# Patient Record
Sex: FEMALE | Race: BLACK OR AFRICAN AMERICAN | NOT HISPANIC OR LATINO | URBAN - METROPOLITAN AREA
[De-identification: names, ages, dates, MRNs, and addresses within clinical notes are randomized per-mention and may not be internally consistent; named-entity substitution may affect disease eponyms.]

---

## 2019-12-19 ENCOUNTER — EMERGENCY (EMERGENCY)
Facility: HOSPITAL | Age: 24
LOS: 1 days | Discharge: HOME | End: 2019-12-19
Attending: EMERGENCY MEDICINE | Admitting: EMERGENCY MEDICINE
Payer: COMMERCIAL

## 2019-12-19 VITALS
OXYGEN SATURATION: 100 % | TEMPERATURE: 98 F | WEIGHT: 166.89 LBS | DIASTOLIC BLOOD PRESSURE: 68 MMHG | HEIGHT: 67 IN | RESPIRATION RATE: 16 BRPM | SYSTOLIC BLOOD PRESSURE: 114 MMHG | HEART RATE: 79 BPM

## 2019-12-19 LAB
ALBUMIN SERPL ELPH-MCNC: 4.5 G/DL — SIGNIFICANT CHANGE UP (ref 3.3–5)
ALP SERPL-CCNC: 40 U/L — SIGNIFICANT CHANGE UP (ref 40–120)
ALT FLD-CCNC: 7 U/L — LOW (ref 10–45)
ANION GAP SERPL CALC-SCNC: 11 MMOL/L — SIGNIFICANT CHANGE UP (ref 5–17)
AST SERPL-CCNC: 12 U/L — SIGNIFICANT CHANGE UP (ref 10–40)
BASOPHILS # BLD AUTO: 0.08 K/UL — SIGNIFICANT CHANGE UP (ref 0–0.2)
BASOPHILS NFR BLD AUTO: 1.2 % — SIGNIFICANT CHANGE UP (ref 0–2)
BILIRUB SERPL-MCNC: 0.4 MG/DL — SIGNIFICANT CHANGE UP (ref 0.2–1.2)
BUN SERPL-MCNC: 7 MG/DL — SIGNIFICANT CHANGE UP (ref 7–23)
CALCIUM SERPL-MCNC: 9.2 MG/DL — SIGNIFICANT CHANGE UP (ref 8.4–10.5)
CHLORIDE SERPL-SCNC: 105 MMOL/L — SIGNIFICANT CHANGE UP (ref 96–108)
CO2 SERPL-SCNC: 25 MMOL/L — SIGNIFICANT CHANGE UP (ref 22–31)
CREAT SERPL-MCNC: 0.71 MG/DL — SIGNIFICANT CHANGE UP (ref 0.5–1.3)
EOSINOPHIL # BLD AUTO: 0.48 K/UL — SIGNIFICANT CHANGE UP (ref 0–0.5)
EOSINOPHIL NFR BLD AUTO: 7.3 % — HIGH (ref 0–6)
GLUCOSE SERPL-MCNC: 86 MG/DL — SIGNIFICANT CHANGE UP (ref 70–99)
HCG SERPL-ACNC: 817 MIU/ML — HIGH
HCT VFR BLD CALC: 33.9 % — LOW (ref 34.5–45)
HGB BLD-MCNC: 11 G/DL — LOW (ref 11.5–15.5)
IMM GRANULOCYTES NFR BLD AUTO: 0.2 % — SIGNIFICANT CHANGE UP (ref 0–1.5)
LYMPHOCYTES # BLD AUTO: 3.23 K/UL — SIGNIFICANT CHANGE UP (ref 1–3.3)
LYMPHOCYTES # BLD AUTO: 49.4 % — HIGH (ref 13–44)
MCHC RBC-ENTMCNC: 31.3 PG — SIGNIFICANT CHANGE UP (ref 27–34)
MCHC RBC-ENTMCNC: 32.4 GM/DL — SIGNIFICANT CHANGE UP (ref 32–36)
MCV RBC AUTO: 96.6 FL — SIGNIFICANT CHANGE UP (ref 80–100)
MONOCYTES # BLD AUTO: 0.49 K/UL — SIGNIFICANT CHANGE UP (ref 0–0.9)
MONOCYTES NFR BLD AUTO: 7.5 % — SIGNIFICANT CHANGE UP (ref 2–14)
NEUTROPHILS # BLD AUTO: 2.25 K/UL — SIGNIFICANT CHANGE UP (ref 1.8–7.4)
NEUTROPHILS NFR BLD AUTO: 34.4 % — LOW (ref 43–77)
NRBC # BLD: 0 /100 WBCS — SIGNIFICANT CHANGE UP (ref 0–0)
PLATELET # BLD AUTO: 273 K/UL — SIGNIFICANT CHANGE UP (ref 150–400)
POTASSIUM SERPL-MCNC: 4.2 MMOL/L — SIGNIFICANT CHANGE UP (ref 3.5–5.3)
POTASSIUM SERPL-SCNC: 4.2 MMOL/L — SIGNIFICANT CHANGE UP (ref 3.5–5.3)
PROT SERPL-MCNC: 7.6 G/DL — SIGNIFICANT CHANGE UP (ref 6–8.3)
RBC # BLD: 3.51 M/UL — LOW (ref 3.8–5.2)
RBC # FLD: 12.7 % — SIGNIFICANT CHANGE UP (ref 10.3–14.5)
SODIUM SERPL-SCNC: 141 MMOL/L — SIGNIFICANT CHANGE UP (ref 135–145)
WBC # BLD: 6.54 K/UL — SIGNIFICANT CHANGE UP (ref 3.8–10.5)
WBC # FLD AUTO: 6.54 K/UL — SIGNIFICANT CHANGE UP (ref 3.8–10.5)

## 2019-12-19 PROCEDURE — 99284 EMERGENCY DEPT VISIT MOD MDM: CPT

## 2019-12-19 PROCEDURE — 76801 OB US < 14 WKS SINGLE FETUS: CPT | Mod: 26

## 2019-12-19 NOTE — ED PROVIDER NOTE - CARE PLAN
Principal Discharge DX:	Vaginal bleeding  Secondary Diagnosis:	DUB (dysfunctional uterine bleeding) Principal Discharge DX:	Pregnancy  Secondary Diagnosis:	Vaginal bleeding Principal Discharge DX:	Miscarriage  Secondary Diagnosis:	Vaginal bleeding

## 2019-12-19 NOTE — ED PROVIDER NOTE - CLINICAL SUMMARY MEDICAL DECISION MAKING FREE TEXT BOX
Patient with vaginal bleeding post IUD falling out. Will check labs and pregnancy. If all neg clear for discharge. F/u with gyn. Patient with vaginal bleeding post IUD falling out. Will check labs and pregnancy. If all neg clear for discharge. F/u with gyn.  +  HCG  847  will proceed with U/S  T/S sent  GYN  aware - no active bl;elida Patient with vaginal bleeding post IUD falling out. Will check labs and pregnancy. If all neg clear for discharge. F/u with gyn.  +  HCG  847  will proceed with U/S  T/S sent  GYN  aware - no active bleeding-   U/S likely SAB  rx motrin and cytec  reterun to Ed in 2 days for beta re-check or sooner if increased bleeding pain fever

## 2019-12-19 NOTE — ED PROVIDER NOTE - PROGRESS NOTE DETAILS
seen by GYN - recommend cytotec buccally. pt to try expectant mgmt.  recommended to return to ED in 2 days.  I have discussed the discharge plan with the patient. The patient agrees with the plan, as discussed.  The patient understands Emergency Department diagnosis is a preliminary diagnosis often based on limited information and that the patient must adhere to the follow-up plan as discussed.  The patient understands that if the symptoms worsen  the patient may return to the Emergency Department at any time for further evaluation and treatment.

## 2019-12-19 NOTE — ED PROVIDER NOTE - PATIENT PORTAL LINK FT
You can access the FollowMyHealth Patient Portal offered by [facility] by registering at the following website: [email or web address]. By joining [employer]’s FollowMyHealth portal, you will also be able to view your health information using other applications (apps) compatible with our system.

## 2019-12-19 NOTE — ED ADULT TRIAGE NOTE - CHIEF COMPLAINT QUOTE
"I had an IUD but it came out yesterday, now I'm having bleeding and cramping and I want to know if I'm pregnant"

## 2019-12-19 NOTE — ED PROVIDER NOTE - NSFOLLOWUPINSTRUCTIONS_ED_ALL_ED_FT
Return to Emergency Department in 2 days, , for repeat labs.      Miscarriage    WHAT YOU NEED TO KNOW:    A miscarriage is the loss of a fetus within the first 20 weeks of pregnancy. A miscarriage may also be called a spontaneous  or an early pregnancy loss.     DISCHARGE INSTRUCTIONS:    Return to the emergency department if:     You have foul-smelling drainage or pus coming from your vagina.      You have heavy vaginal bleeding and soak 1 pad or more in an hour.       You have severe abdominal pain.      You feel like your heart is beating faster than normal.       You feel extremely weak or dizzy.     Contact your healthcare provider if:     You have a fever greater than 100.4°F or chills.       You have extreme sadness, grief, or feel unable to cope with what has happened.       You have questions or concerns about your condition or care.    Self-care:     Do not put anything in your vagina for 2 weeks or as directed. Do not use tampons, douche, or have sex. These actions can cause infection and pain.       Use sanitary pads as needed. You may have light bleeding or spotting for 2 weeks.       Do not take a bath or go swimming for 2 weeks or as directed. These actions may increase your risk for an infection. Take showers only.       Rest as needed. Slowly start to do more each day. Return to your daily activities as directed.       Talk to your healthcare provider about birth control. If you would like to prevent another pregnancy, ask your healthcare provider which type of birth control is best for you.       Join a support group or therapy to help you cope. A miscarriage may be very difficult for you, your partner, and other members of your family. There is no right way to feel after a miscarriage. You may feel overwhelming grief or other emotions. It may be helpful to talk to a friend, family member, or counselor about your feelings. You may worry that you could have another miscarriage. Talk to your healthcare provider about your concerns. He may be able to help you reduce the risk for another miscarriage. He may also help you find ways to cope with grief.     For more information:     The [employer]  [address] [address]  [place] [address]  Phone: [phone]  Phone: 1-760.330.5012  Web Address: http://www.ac.org      [employer]  [address] [place], [place] [address]  Web Address: http://www.[employer]      Follow up with your healthcare provider as directed: You may need to see your healthcare provider for blood tests or an ultrasound. Write down your questions so you remember to ask them during your visits.

## 2019-12-19 NOTE — ED PROVIDER NOTE - OBJECTIVE STATEMENT
23 y/o f with no pmh present to ED c/o IUD fell out yesterday arounf 5pm and began to have vaginal bleeding with cramping.  She report of IUD was in place for 2-3 months. She report of vaginal bleeding being heavier than a menstrual cycle. LMP 11/22/19. Denies sob, chest pain, n, v,. Last sexual intercourse was a week ago.

## 2019-12-20 VITALS
SYSTOLIC BLOOD PRESSURE: 122 MMHG | OXYGEN SATURATION: 98 % | HEART RATE: 78 BPM | RESPIRATION RATE: 18 BRPM | DIASTOLIC BLOOD PRESSURE: 68 MMHG

## 2019-12-20 LAB
BLD GP AB SCN SERPL QL: NEGATIVE — SIGNIFICANT CHANGE UP
RH IG SCN BLD-IMP: POSITIVE — SIGNIFICANT CHANGE UP

## 2019-12-20 PROCEDURE — 36415 COLL VENOUS BLD VENIPUNCTURE: CPT

## 2019-12-20 PROCEDURE — 85025 COMPLETE CBC W/AUTO DIFF WBC: CPT

## 2019-12-20 PROCEDURE — 86850 RBC ANTIBODY SCREEN: CPT

## 2019-12-20 PROCEDURE — 99284 EMERGENCY DEPT VISIT MOD MDM: CPT

## 2019-12-20 PROCEDURE — 86900 BLOOD TYPING SEROLOGIC ABO: CPT

## 2019-12-20 PROCEDURE — 84702 CHORIONIC GONADOTROPIN TEST: CPT

## 2019-12-20 PROCEDURE — 76801 OB US < 14 WKS SINGLE FETUS: CPT

## 2019-12-20 PROCEDURE — 80053 COMPREHEN METABOLIC PANEL: CPT

## 2019-12-20 PROCEDURE — 86901 BLOOD TYPING SEROLOGIC RH(D): CPT

## 2019-12-20 RX ORDER — IBUPROFEN 200 MG
600 TABLET ORAL ONCE
Refills: 0 | Status: COMPLETED | OUTPATIENT
Start: 2019-12-20 | End: 2019-12-20

## 2019-12-20 RX ADMIN — Medication 600 MILLIGRAM(S): at 00:59

## 2019-12-20 NOTE — CONSULT NOTE ADULT - SUBJECTIVE AND OBJECTIVE BOX
24y  with Last Menstrual Period 11/10 presenting with vaginal bleeding that started after a spontaneous IUD explosion yesterday.  2019 Placed a copper IUD, yesterday in the shower reports abdominal cramps after which vaginal bleeding with clots appeared requiring pad chage every 2 hours. This evening the bleeding subsided and still endorses cramps (tolerable).  Denies fever, chills, chest pain, palpitations, SOB, n/v.  +flatus, +BM    OB H/x:  G1 -    G2 - 2018 C/S  G3 - current    GYN H/x:  None  Copper IUD placed 2009    MED H/x:  None    SURG H/x:  2018 C/S    Medications:  None    Allergies:   NKDA     Vital Signs Last 24 Hrs  T(C): 36.4 (19 Dec 2019 19:44), Max: 36.4 (19 Dec 2019 19:44)  T(F): 97.6 (19 Dec 2019 19:44), Max: 97.6 (19 Dec 2019 19:44)  HR: 79 (19 Dec 2019 19:44) (79 - 79)  BP: 114/68 (19 Dec 2019 19:44) (114/68 - 114/68)  RR: 16 (19 Dec 2019 19:44) (16 - 16)  SpO2: 100% (19 Dec 2019 19:44) (100% - 100%)    Physical Exam:  Gen: NAD, comfortable  GI: soft, mild tenderness over the uterus, nondistended + BS, no rebound no guarding  BME: normal anteverted uterus, no adnexal masses appreciated ,mild cervical motion tenderness   Spec: Cervix appears open 1cm with mild bleeding (menses-like).  Ext: no edema, erythema, tenderness     LABS:                        11.0   6.54  )-----------( 273      ( 19 Dec 2019 20:45 )             33.9         141  |  105  |  7   ----------------------------<  86  4.2   |  25  |  0.71    Ca    9.2      19 Dec 2019 22:30    TPro  7.6  /  Alb  4.5  /  TBili  0.4  /  DBili  x   /  AST  12  /  ALT  7<L>  /  AlkPhos  40      HCG - 817      RADIOLOGY & ADDITIONAL STUDIES:  < from: US OB <=14 Weeks, First Gestation (19 @ 23:15) >    EXAM:  US OB LES THAN 14 WKS 1ST GEST                          PROCEDURE DATE:  2019          INTERPRETATION:  PELVIC ULTRASOUND dated 2019 11:15 PM    INDICATION: Pregnancy. Vaginal bleeding.   AGE: 24 Beta-hC LMP: 11/10/2019    TECHNIQUE: Transabdominal views of the pelvis were obtained. Transvaginal imaging was deferred due to the presence of a structure in the cervix, as per department protocol.    PRIOR STUDIES: None    FINDINGS:     There is a 3.1 x 3.2 x 3.3 cm thick-walled cystic structure in the lower uterine segment/endocervical canal likely representing an  in progress. No fetal pole or yolk sac is seen. No other evidence of intrauterine or extra uterine gestation is seen.    These images demonstrate theuterus to be anteverted. The uterus is normal in size and shape, measuring 11.2 x 2.8 x 6.0 cm. No myometrial abnormalities are seen. The endometrium is 0.7 cm in thickness, which is normal.    A 4.8 x 3.4 x 4.1 cm dermoid cyst is seen in the right adnexa. The contiguous native right ovarian parenchyma is normal in appearance with arterial and venous flow. The left ovary is normal in size, measuring 3.0 x 1.5 x 2.2 cm with a calculated volume of 5.3 mL. No left ovarian masses are seen. Arterial and venous flow is demonstrated to the left ovary without evidence of torsion.    No free fluid is seen in the cul-de-sac.      IMPRESSION:   1.  3.3 cm thick-walled cystic structure in the lower uterine segment/endocervical canal suggestive of spontaneous  in progress. Continued surveillance and serial beta-hCG measurement is recommended.  2.  4.8 cm right ovarian dermoid cyst.            Thank you for the opportunity to participate in the care of this patient.    DEION LANDA M.D., RADIOLOGY RESIDENT  This document has been electronically signed.  IMAN ANDINO M.D., ATTENDING RADIOLOGIST  This document has been electronically signed. Dec 19 2019 11:36PM                  < end of copied text >

## 2019-12-20 NOTE — CONSULT NOTE ADULT - ASSESSMENT
24y  with Last Menstrual Period 11/10 presenting with vaginal bleeding that started after a spontaneous IUD expulsion yesterday.  The patient was unaware of this pregnancy, most likely in the process of spontaneous .   On US:   1.  3.3 cm thick-walled cystic structure in the lower uterine segment/endocervical canal suggestive of spontaneous  in progress. Continued surveillance and serial beta-hCG measurement is recommended.  2.  4.8 cm right ovarian dermoid cyst.    Labs WNL (HB 11), Blood type RH positive.  The situation was discussed with the patient and the option of giving Cytotec to facilitate the miscarriage vs. observation (risk and benefits were discussed) - the patient opted for Cytotec.    800 mcg buccal Cytotec was given in the ED with 600mg of Motrin.  The Patient was consulted to return to the ED in two days for a f/u check.    - No acute GYN intervention indicated at this time in regards to Rt ovarian cyst - to be f/u with primary GYN.  - Patient is hemodynamically stable, feeling overall well and may be discharged from a GYN perspective.   - Strict return precautions given: severe pain, heavy bleeding; strict pelvic rest.     Patient evaluated at bedside with OB-GYN attending Dr. Russell.

## 2019-12-21 ENCOUNTER — EMERGENCY (EMERGENCY)
Facility: HOSPITAL | Age: 24
LOS: 1 days | Discharge: HOME | End: 2019-12-21
Attending: EMERGENCY MEDICINE | Admitting: EMERGENCY MEDICINE
Payer: COMMERCIAL

## 2019-12-21 VITALS
HEART RATE: 72 BPM | HEIGHT: 67 IN | RESPIRATION RATE: 18 BRPM | TEMPERATURE: 98 F | SYSTOLIC BLOOD PRESSURE: 112 MMHG | OXYGEN SATURATION: 100 % | WEIGHT: 164.91 LBS | DIASTOLIC BLOOD PRESSURE: 57 MMHG

## 2019-12-21 LAB — HCG SERPL-ACNC: 247 MIU/ML — HIGH

## 2019-12-21 PROCEDURE — 84702 CHORIONIC GONADOTROPIN TEST: CPT

## 2019-12-21 PROCEDURE — 36415 COLL VENOUS BLD VENIPUNCTURE: CPT

## 2019-12-21 PROCEDURE — 99284 EMERGENCY DEPT VISIT MOD MDM: CPT | Mod: 25

## 2019-12-21 PROCEDURE — 99284 EMERGENCY DEPT VISIT MOD MDM: CPT

## 2019-12-21 NOTE — ED PROVIDER NOTE - PATIENT PORTAL LINK FT
You can access the FollowMyHealth Patient Portal offered by [facility] by registering at the following website: [email or web address]. By joining [employer]’s FollowMyHealth portal, you will also be able to view your health information using other applications (apps) compatible with our system.

## 2019-12-21 NOTE — CONSULT NOTE ADULT - SUBJECTIVE AND OBJECTIVE BOX
24y  with Last Menstrual Period 11/10 presenting for repeat b-hcg after episode of vaginal bleeding and IUD expulsion. Patient reports mild to moderate amount of bleeding for the past two days with cramping but pain has been tolerable.     Denies fever, chills, chest pain, palpitations, SOB, n/v.  +flatus, +BM    OB H/x:  G1 -    G2 - 2018 C/S  G3 - current    GYN H/x:  None  Copper IUD placed 2009- expelled     MED H/x:  None    SURG H/x:  2018 C/S    Medications:  None    Allergies:   NKDA     Vital Signs Last 24 Hrs  T(C): 36.4 (19 Dec 2019 19:44), Max: 36.4 (19 Dec 2019 19:44)  T(F): 97.6 (19 Dec 2019 19:44), Max: 97.6 (19 Dec 2019 19:44)  HR: 79 (19 Dec 2019 19:44) (79 - 79)  BP: 114/68 (19 Dec 2019 19:44) (114/68 - 114/68)  RR: 16 (19 Dec 2019 19:44) (16 - 16)  SpO2: 100% (19 Dec 2019 19:44) (100% - 100%)    Physical Exam:  Gen: NAD, comfortable  GI: soft, mild tenderness over the uterus, nondistended + BS, no rebound no guarding  BME: normal anteverted uterus, no adnexal masses appreciated ,mild cervical motion tenderness   Spec: Cervix appears open 1cm with mild bleeding (menses-like).  Ext: no edema, erythema, tenderness     LABS:                        11.0   6.54  )-----------( 273      ( 19 Dec 2019 20:45 )             33.9         141  |  105  |  7   ----------------------------<  86  4.2   |  25  |  0.71    Ca    9.2      19 Dec 2019 22:30    TPro  7.6  /  Alb  4.5  /  TBili  0.4  /  DBili  x   /  AST  12  /  ALT  7<L>  /  AlkPhos  40      HCG - 817      RADIOLOGY & ADDITIONAL STUDIES:  < from: US OB <=14 Weeks, First Gestation (19 @ 23:15) >    EXAM:  US OB LES THAN 14 WKS 1ST GEST                          PROCEDURE DATE:  2019          INTERPRETATION:  PELVIC ULTRASOUND dated 2019 11:15 PM    INDICATION: Pregnancy. Vaginal bleeding.   AGE: 24 Beta-hC LMP: 11/10/2019    TECHNIQUE: Transabdominal views of the pelvis were obtained. Transvaginal imaging was deferred due to the presence of a structure in the cervix, as per department protocol.    PRIOR STUDIES: None    FINDINGS:     There is a 3.1 x 3.2 x 3.3 cm thick-walled cystic structure in the lower uterine segment/endocervical canal likely representing an  in progress. No fetal pole or yolk sac is seen. No other evidence of intrauterine or extra uterine gestation is seen.    These images demonstrate theuterus to be anteverted. The uterus is normal in size and shape, measuring 11.2 x 2.8 x 6.0 cm. No myometrial abnormalities are seen. The endometrium is 0.7 cm in thickness, which is normal.    A 4.8 x 3.4 x 4.1 cm dermoid cyst is seen in the right adnexa. The contiguous native right ovarian parenchyma is normal in appearance with arterial and venous flow. The left ovary is normal in size, measuring 3.0 x 1.5 x 2.2 cm with a calculated volume of 5.3 mL. No left ovarian masses are seen. Arterial and venous flow is demonstrated to the left ovary without evidence of torsion.    No free fluid is seen in the cul-de-sac.      IMPRESSION:   1.  3.3 cm thick-walled cystic structure in the lower uterine segment/endocervical canal suggestive of spontaneous  in progress. Continued surveillance and serial beta-hCG measurement is recommended.  2.  4.8 cm right ovarian dermoid cyst.            Thank you for the opportunity to participate in the care of this patient.    DEION LANDA M.D., RADIOLOGY RESIDENT  This document has been electronically signed.  IMAN ANDINO M.D., ATTENDING RADIOLOGIST  This document has been electronically signed. Dec 19 2019 11:36PM                  < end of copied text > 24y  with Last Menstrual Period 11/10 presenting for repeat b-hcg after episode of vaginal bleeding and IUD expulsion. Patient reports mild to moderate amount of bleeding for the past two days with cramping but pain has been tolerable. Patient received cytotec on     Denies fever, chills, chest pain, palpitations, SOB, n/v.  +flatus, +BM    OB H/x:  G1 -    G2 - 2018 C/S  G3 - current    GYN H/x:  None  Copper IUD placed 2009- expelled     MED H/x:  None    SURG H/x:  2018 C/S    Medications:  None    Allergies:   NKDA     Vital Signs Last 24 Hrs  T(C): 36.8 (21 Dec 2019 19:20), Max: 36.8 (21 Dec 2019 19:20)  T(F): 98.2 (21 Dec 2019 19:20), Max: 98.2 (21 Dec 2019 19:20)  HR: 72 (21 Dec 2019 19:20) (72 - 72)  BP: 112/57 (21 Dec 2019 19:20) (112/57 - 112/57)  BP(mean): --  RR: 18 (21 Dec 2019 19:20) (18 - 18)  SpO2: 100% (21 Dec 2019 19:20) (100% - 100%)    Physical Exam:  Gen: NAD, comfortable  GI: soft, mild tenderness over the uterus, nondistended + BS, no rebound no guarding  BME: normal anteverted uterus, no adnexal masses appreciated ,mild cervical motion tenderness   Spec: Cervix appears open 1cm with clot at cervical os  Ext: no edema, erythema, tenderness           HCG Quantitative, Serum (19 @ 19:58)    HCG Quantitative, Serum: 247: HCG-Quantitative test interpretations: This test is intended only for the  detection & monitoring of pregnancy. For oncology studies order the tumor  marker test “HCG-TM” (hCG-Tumor Marker).  For pregnancy evaluation the reference values are as follows:  Negative:  <=4 mIU/mL  Indeterminate:  5 - 25 mIU/mL (suggest repeat testing in 72 hours)  Positive:  > 25 mIU/mL  Reference Values during Pregnancy:  Weeks after LMP* REFERENCE INTERVAL mIU/mL     3      6 - 71     4      10 - 750     5      220 - 7,100     6      160 - 32,000     7      3,700 - 164,000     8      32,000 - 150,000     9      64,000 - 151,000     10      47,000 - 187,000     12      28,000 - 211,000     14      14,000 - 63,000     15      12,000 - 71,000     16 - 18  8,000 - 58,000  * LMP:  Last Menstrual Period  HCG results of false positive and false negative for pregnancy are rare,  but can occur with this, and other, hCG tests. Rody- and post-menopausal  females may have mildly elevated hCG concentrations usually less than 14  mIU/mL that are constant over time. mIU/mL

## 2019-12-21 NOTE — ED PROVIDER NOTE - NS ED ROS FT
Constitutional: No fever or chills.   Eyes: No pain, blurry vision, or discharge.  ENMT: No hearing changes, pain, discharge or infections. No neck pain or stiffness.  Cardiac: No chest pain, SOB or edema. No chest pain with exertion.  Respiratory: No cough or respiratory distress. No hemoptysis. No history of asthma or RAD.  GI: No nausea, vomiting, diarrhea   : See HPI  MS: No myalgia, muscle weakness, joint pain or back pain.  Neuro: No headache or weakness. No LOC.  Skin: No skin rash.   Endocrine: No history of thyroid disease or diabetes.  Except as documented in the HPI, all other systems are negative.

## 2019-12-21 NOTE — ED PROVIDER NOTE - OBJECTIVE STATEMENT
Pt w/ no sig PMx, , LMP 11/10 presenting for repeat HCG after episode of vaginal bleeding and IUD expulsion on , found to be pregnant. HCG at that time 817, Rh+, US showing "3.3 cm thick-walled cystic structure in the lower uterine segment/endocervical canal suggestive of spontaneous  in progress." Pt seen by gyn and pt received Cytotec on . Patient reports mild to moderate amount of bleeding for the past two days, report decreased amount of bleeding since original presentation,  with mild cramping, that has been tolerable.

## 2019-12-21 NOTE — ED ADULT NURSE NOTE - CHPI ED NUR SYMPTOMS NEG
no blood in stool/no chills/no diarrhea/no dysuria/no hematuria/no nausea/no fever/no abdominal distension

## 2019-12-21 NOTE — ED PROVIDER NOTE - CLINICAL SUMMARY MEDICAL DECISION MAKING FREE TEXT BOX
Pt here for repeat HCG s/p recent Cytotec for likely missed AB w/ IUD expulsion, less likely ectopic. Benign, non tender abd. HCG and gyn consult. Dispo pending gyn recommendations.

## 2019-12-21 NOTE — CONSULT NOTE ADULT - ASSESSMENT
24y  with Last Menstrual Period 11/10 presenting with vaginal bleeding that started after a spontaneous IUD expulsion yesterday.  The patient was unaware of this pregnancy, most likely in the process of spontaneous .   On US:   1.  3.3 cm thick-walled cystic structure in the lower uterine segment/endocervical canal suggestive of spontaneous  in progress. Continued surveillance and serial beta-hCG measurement is recommended.  2.  4.8 cm right ovarian dermoid cyst.    Labs WNL (HB 11), Blood type RH positive.  The situation was discussed with the patient and the option of giving Cytotec to facilitate the miscarriage vs. observation (risk and benefits were discussed) - the patient opted for Cytotec.    800 mcg buccal Cytotec was given in the ED with 600mg of Motrin.  The Patient was consulted to return to the ED in two days for a f/u check.    - No acute GYN intervention indicated at this time in regards to Rt ovarian cyst - to be f/u with primary GYN.  - Patient is hemodynamically stable, feeling overall well and may be discharged from a GYN perspective.   - Strict return precautions given: severe pain, heavy bleeding; strict pelvic rest.     Patient evaluated at bedside with OB-GYN attending Dr. Russell. 24y  with Last Menstrual Period 11/10 presenting with vaginal bleeding that started after a spontaneous IUD expulsion yesterday.  The patient was unaware of this pregnancy, most likely in the process of spontaneous . B-hcg 817>247 today with continued bleeding s/p cytotec 800mcg buccally     On bedside US: Cervix dilated, with clot in cervix, nothing visualized in the  uterus.     - No acute GYN intervention indicated at this time i  - Patient is hemodynamically stable, feeling overall well and may be discharged from a GYN perspective.   - Strict return precautions given: severe pain, heavy bleeding; strict pelvic rest.   -Patient to return in 1 week  for repeat b-hcg     Patient discussed with OB-GYN attending Dr. He

## 2019-12-21 NOTE — ED PROVIDER NOTE - PHYSICAL EXAMINATION
Constitutional: Well appearing, well nourished, awake, alert, oriented to person, place, time/situation and in no apparent distress.  ENMT: Airway patent. Normal MM  Eyes: Clear bilaterally  Cardiac: Normal rate, regular rhythm.  Heart sounds S1, S2.  No murmurs, rubs or gallops.  Respiratory: Breaths sounds equal and clear b/l. No increased WOB, tachypnea, hypoxia, or accessory mm use. Pt speaks in full sentences.   Gastrointestinal: Abd soft, NT, ND, NABS. No guarding, rebound, or rigidity. No pulsatile abdominal masses. No organomegaly appreciated. No CVAT    deferred to gyn  Musculoskeletal: Range of motion is not limited  Neuro: Alert and oriented x 3, face symmetric and speech fluent. Strength 5/5 x 4 ext and symmetric, nml gross motor movement, nml gait. No focal deficits noted.  Skin: Skin normal color for race, warm, dry and intact. No evidence of rash.  Psych: Alert and oriented to person, place, time/situation. normal mood and affect. no apparent risk to self or others.

## 2019-12-21 NOTE — ED PROVIDER NOTE - NSFOLLOWUPINSTRUCTIONS_ED_ALL_ED_FT
Your pregnancy hormone level (HCG) decreased from 817 to 247. PLEASE RETURN IN 1 WEEK FOR REPEAT HCG TESTING. Return to the ED sooner if you experience heavy bleeding (more than 1 pad per hour), severe abdominal pain, feel faint, or other concerning symptoms.    Miscarriage    WHAT YOU NEED TO KNOW:    A miscarriage is the loss of a fetus within the first 20 weeks of pregnancy. A miscarriage may also be called a spontaneous  or an early pregnancy loss.     DISCHARGE INSTRUCTIONS:    Return to the emergency department if:     You have foul-smelling drainage or pus coming from your vagina.      You have heavy vaginal bleeding and soak 1 pad or more in an hour.       You have severe abdominal pain.      You feel like your heart is beating faster than normal.       You feel extremely weak or dizzy.     Contact your healthcare provider if:     You have a fever greater than 100.4°F or chills.       You have extreme sadness, grief, or feel unable to cope with what has happened.       You have questions or concerns about your condition or care.    Self-care:     Do not put anything in your vagina for 2 weeks or as directed. Do not use tampons, douche, or have sex. These actions can cause infection and pain.       Use sanitary pads as needed. You may have light bleeding or spotting for 2 weeks.       Do not take a bath or go swimming for 2 weeks or as directed. These actions may increase your risk for an infection. Take showers only.       Rest as needed. Slowly start to do more each day. Return to your daily activities as directed.       Talk to your healthcare provider about birth control. If you would like to prevent another pregnancy, ask your healthcare provider which type of birth control is best for you.       Join a support group or therapy to help you cope. A miscarriage may be very difficult for you, your partner, and other members of your family. There is no right way to feel after a miscarriage. You may feel overwhelming grief or other emotions. It may be helpful to talk to a friend, family member, or counselor about your feelings. You may worry that you could have another miscarriage. Talk to your healthcare provider about your concerns. He may be able to help you reduce the risk for another miscarriage. He may also help you find ways to cope with grief.     For more information:     The [employer]  [address] [address]  [place] [address]  Phone: [phone]  Phone: [phone]  Web Address: http://www.acog.org      [facility]  [address] [place] [address]  Web Address: [email or web address]      Follow up with your healthcare provider as directed: You may need to see your healthcare provider for blood tests or an ultrasound. Write down your questions so you remember to ask them during your visits.

## 2019-12-22 PROBLEM — Z78.9 OTHER SPECIFIED HEALTH STATUS: Chronic | Status: ACTIVE | Noted: 2019-12-19

## 2019-12-24 DIAGNOSIS — O03.9 COMPLETE OR UNSPECIFIED SPONTANEOUS ABORTION WITHOUT COMPLICATION: ICD-10-CM

## 2019-12-24 DIAGNOSIS — N93.9 ABNORMAL UTERINE AND VAGINAL BLEEDING, UNSPECIFIED: ICD-10-CM

## 2019-12-24 DIAGNOSIS — Z3A.00 WEEKS OF GESTATION OF PREGNANCY NOT SPECIFIED: ICD-10-CM

## 2019-12-24 DIAGNOSIS — R10.2 PELVIC AND PERINEAL PAIN: ICD-10-CM

## 2019-12-26 ENCOUNTER — APPOINTMENT (OUTPATIENT)
Dept: FAMILY MEDICINE | Facility: CLINIC | Age: 24
End: 2019-12-26

## 2019-12-26 DIAGNOSIS — O03.9 COMPLETE OR UNSPECIFIED SPONTANEOUS ABORTION WITHOUT COMPLICATION: ICD-10-CM

## 2019-12-26 DIAGNOSIS — O20.9 HEMORRHAGE IN EARLY PREGNANCY, UNSPECIFIED: ICD-10-CM

## 2019-12-26 PROBLEM — Z00.00 ENCOUNTER FOR PREVENTIVE HEALTH EXAMINATION: Status: ACTIVE | Noted: 2019-12-26

## 2019-12-28 NOTE — CHART NOTE - NSCHARTNOTEFT_GEN_A_CORE
Called patient regarding following up b-hcg, patient was unable to come to hospital this weekend, will come on Monday 12/30, patient reports feeling well.

## 2019-12-30 ENCOUNTER — EMERGENCY (EMERGENCY)
Facility: HOSPITAL | Age: 24
LOS: 1 days | Discharge: HOME | End: 2019-12-30
Admitting: EMERGENCY MEDICINE
Payer: COMMERCIAL

## 2019-12-30 VITALS
HEART RATE: 75 BPM | HEIGHT: 67 IN | SYSTOLIC BLOOD PRESSURE: 113 MMHG | DIASTOLIC BLOOD PRESSURE: 78 MMHG | RESPIRATION RATE: 16 BRPM | TEMPERATURE: 98 F | WEIGHT: 166.89 LBS | OXYGEN SATURATION: 100 %

## 2019-12-30 LAB — HCG SERPL-ACNC: 14 MIU/ML — HIGH

## 2019-12-30 PROCEDURE — 99283 EMERGENCY DEPT VISIT LOW MDM: CPT

## 2019-12-30 PROCEDURE — 36415 COLL VENOUS BLD VENIPUNCTURE: CPT

## 2019-12-30 PROCEDURE — 84702 CHORIONIC GONADOTROPIN TEST: CPT

## 2019-12-30 PROCEDURE — 99284 EMERGENCY DEPT VISIT MOD MDM: CPT

## 2019-12-30 RX ORDER — LORATADINE 10 MG/1
1 TABLET ORAL
Qty: 20 | Refills: 0
Start: 2019-12-30

## 2019-12-30 RX ORDER — LORATADINE 10 MG/1
10 TABLET ORAL ONCE
Refills: 0 | Status: COMPLETED | OUTPATIENT
Start: 2019-12-30 | End: 2019-12-30

## 2019-12-30 RX ADMIN — LORATADINE 10 MILLIGRAM(S): 10 TABLET ORAL at 17:05

## 2019-12-30 NOTE — ED PROVIDER NOTE - NSFOLLOWUPINSTRUCTIONS_ED_ALL_ED_FT
Please return to ED in one week for urine pregnancy test.  Also call to make outpatient follow up with gyn specialist.  Return to ED for fever, increased vaginal bleeding, abdominal pain, dizziness, fainting

## 2019-12-30 NOTE — ED PROVIDER NOTE - CARE PROVIDER_API CALL
Lurdes Christiansen)  OBGYN  [address], [facility] [address]  [place] [id number]  Phone: [phone]  Fax: [phone]  Follow Up Time:

## 2019-12-30 NOTE — ED ADULT NURSE NOTE - OBJECTIVE STATEMENT
25 yo F c/o beta HCG check. Pt states she was told to return for the repeat blood test. Denies cramping, vaginal bleeding, pain, N/V/D, fevers/chills, CP, SOB. Pt speaking in full complete sentences, +VALENTINO, in NAD. C/o congestion related to seasonal allergies.

## 2019-12-30 NOTE — CONSULT NOTE ADULT - ASSESSMENT
A/P: 24y  with LMP 11/10 presenting for repeat b-hcg after episode of vaginal bleeding and IUD expulsion on  with sonogram suggestive of  in progress now s/p 800mcg buccal cytotec on . B-hcg appropriately decreasing 817>247>14 today with continued improvement in vaginal bleeding and cramping. She is clinically and hemodynamically stable, no acute gyn intervention at this time. Recommend patient return to ED in 10 days for follow up urine bhcg to confirm negative test. Strict precautions given to return sooner for worsening heavy vaginal bleeding or severe pain. Also recommend follow up with [facility] (information given by ED providers).    Patient seen by Preethi Bee PGY2  d/w Dr Griffin Russell A/P: 24y  with LMP 11/10 presenting for repeat b-hcg after episode of vaginal bleeding and IUD expulsion on  with sonogram suggestive of  in progress now s/p 800mcg buccal cytotec on . B-hcg appropriately decreasing 817>247>14 today with continued improvement in vaginal bleeding and cramping. She is clinically and hemodynamically stable, no acute gyn intervention at this time. Recommend patient return to ED in 10 days for follow up urine hcg to confirm negative test. Strict precautions given to return sooner for worsening heavy vaginal bleeding or severe pain. Also recommend follow up with [facility] (information given by ED providers).    Patient seen by Preethi Bee PGY2  d/w Dr Griffin Russell

## 2019-12-30 NOTE — CONSULT NOTE ADULT - ATTENDING COMMENTS
I have reviewed al pertinent clinical information for this patient and agree with the state plan above. Patient to RTC or ED for follow up ucg to ensure it is negative in 1-2 weeks. Patient otherwise stable with normal vital signs.

## 2019-12-30 NOTE — ED PROVIDER NOTE - PATIENT PORTAL LINK FT
You can access the FollowMyHealth Patient Portal offered by [facility] by registering at the following website: [email or web address]. By joining [employer]’s FollowMyHealth portal, you will also be able to view your health information using other applications (apps) compatible with our system.

## 2019-12-30 NOTE — ED PROVIDER NOTE - PHYSICAL EXAMINATION
Vitals reviewed  Gen: well appearing, nad, speaking in full sentences  Skin: wwp   HEENT: ncat, eomi, mmm. positive clear rhinorrhea.   CV: rrr, no audible m/r/g  Resp: ctab, no w/r/r  Abd: soft/nt  Ext: FROM throughout, no peripheral edema  Neuro: alert/oriented, no focal deficits, steady gait

## 2019-12-30 NOTE — ED ADULT TRIAGE NOTE - CHIEF COMPLAINT QUOTE
here for f/u for beta hcg last checked x1 week ago, pt reports vaginal bleeding has resolved, no cramping; pt also c/o nasal congestion and HA

## 2019-12-30 NOTE — CHART NOTE - NSCHARTNOTEFT_GEN_A_CORE
Pt called but Pt did not answer. Message was left regarding the need for follow up today for repeat bHCG.

## 2019-12-30 NOTE — ED PROVIDER NOTE - CLINICAL SUMMARY MEDICAL DECISION MAKING FREE TEXT BOX
25 y/o F presents to repeat HCG after an  1 week ago w/ cytotec.  hcg downtrending.  GYN recommends return in one week for urine preg only.  pt given claritin for seasonal allergies and d/c in stable condition.

## 2019-12-30 NOTE — CONSULT NOTE ADULT - SUBJECTIVE AND OBJECTIVE BOX
24y  with LMP 1110 presenting for repeat b-hcg after episode of vaginal bleeding and IUD expulsion. She was initially seen on  with cg 817 with cystic structure in lower uterine segment/ endocervical canal suggestive of  in progress so was given 800mcg buccal cytotec and sent home. She then had a follow up bhcg  which was 247 and was sent home with follow up bhcg today which was 14. She says the vaginal bleeding and cramping have continued to decrease. C/o seasonal allergies though otherwise has no complaints, denies n/v, chest pain, palpitations, SOB, or dizziness.    OB H/x:  G1 -    G2 - 2018 C/S  G3 - current    GYN H/x:  None  Copper IUD placed 2009- expelled     MED H/x:  None    SURG H/x:  2018 C/S    Medications:  None    Allergies:   NKDA     Vital Signs Last 24 Hrs  T(C): 36.6 (30 Dec 2019 15:46), Max: 36.6 (30 Dec 2019 15:46)  T(F): 97.8 (30 Dec 2019 15:46), Max: 97.8 (30 Dec 2019 15:46)  HR: 75 (30 Dec 2019 15:46) (75 - 75)  BP: 113/78 (30 Dec 2019 15:46) (113/78 - 113/78)  BP(mean): --  RR: 16 (30 Dec 2019 15:46) (16 - 16)  SpO2: 100% (30 Dec 2019 15:46) (100% - 100%)    Physical Exam:  Gen: NAD, appears well, nasal congestion  GI: soft, nontender, nondistended, no rebound no guarding  : no vaginal bleeding appreciated  Ext: wnl      LABS: cg 14

## 2019-12-30 NOTE — ED ADULT NURSE NOTE - CHPI ED NUR SYMPTOMS NEG
no vomiting/no chills/no nausea/no weakness/no decreased eating/drinking/no dizziness/no pain/no fever/no tingling

## 2020-01-06 ENCOUNTER — APPOINTMENT (OUTPATIENT)
Dept: OBGYN | Facility: CLINIC | Age: 25
End: 2020-01-06

## 2020-01-07 DIAGNOSIS — Z91.040 LATEX ALLERGY STATUS: ICD-10-CM

## 2020-01-07 DIAGNOSIS — N93.9 ABNORMAL UTERINE AND VAGINAL BLEEDING, UNSPECIFIED: ICD-10-CM

## 2020-01-09 NOTE — CHART NOTE - NSCHARTNOTEFT_GEN_A_CORE
I spoke with Pt regarding follow up here today for repeat bHCG. Pt said she will not be able to come in today and will try to come in tomorrow.

## 2020-01-11 NOTE — CHART NOTE - NSCHARTNOTEFT_GEN_A_CORE
Called patient, says she will come tomorrow 1/12 at 5pm for follow up repeat bhcg. She is feeling well and has no complaints.

## 2020-01-13 NOTE — CHART NOTE - NSCHARTNOTEFT_GEN_A_CORE
01-13-20 @ 10:21    Dear Ms. GIL GRIFFIN,      We are writing to follow up about your last visit to the emergency department at [facility] regarding your ongoing medical condition. We have tried contacting you several times via telephone over the last few days and were not able to get a hold of you. We urgently advise you to follow up and return to [facility] emergency department, or any emergency department, for additional evaluation. Please call [phone] with any questions.       Thank you,        Lindsay Thibodeaux PA-C  [facility]   [address] [place] [address] [place] [address]  [phone]

## 2020-01-27 ENCOUNTER — EMERGENCY (EMERGENCY)
Facility: HOSPITAL | Age: 25
LOS: 1 days | Discharge: HOME | End: 2020-01-27
Attending: EMERGENCY MEDICINE | Admitting: EMERGENCY MEDICINE
Payer: COMMERCIAL

## 2020-01-27 VITALS
HEART RATE: 75 BPM | TEMPERATURE: 98 F | DIASTOLIC BLOOD PRESSURE: 80 MMHG | OXYGEN SATURATION: 100 % | SYSTOLIC BLOOD PRESSURE: 117 MMHG | WEIGHT: 167.99 LBS | HEIGHT: 67 IN | RESPIRATION RATE: 16 BRPM

## 2020-01-27 DIAGNOSIS — R10.2 PELVIC AND PERINEAL PAIN: ICD-10-CM

## 2020-01-27 DIAGNOSIS — Z79.899 OTHER LONG TERM (CURRENT) DRUG THERAPY: ICD-10-CM

## 2020-01-27 DIAGNOSIS — Z91.040 LATEX ALLERGY STATUS: ICD-10-CM

## 2020-01-27 LAB
ALBUMIN SERPL ELPH-MCNC: 4.3 G/DL — SIGNIFICANT CHANGE UP (ref 3.3–5)
ALP SERPL-CCNC: SIGNIFICANT CHANGE UP U/L (ref 40–120)
ALT FLD-CCNC: SIGNIFICANT CHANGE UP U/L (ref 10–45)
ANION GAP SERPL CALC-SCNC: 11 MMOL/L — SIGNIFICANT CHANGE UP (ref 5–17)
AST SERPL-CCNC: SIGNIFICANT CHANGE UP U/L (ref 10–40)
BASOPHILS # BLD AUTO: 0.09 K/UL — SIGNIFICANT CHANGE UP (ref 0–0.2)
BASOPHILS NFR BLD AUTO: 1.1 % — SIGNIFICANT CHANGE UP (ref 0–2)
BILIRUB SERPL-MCNC: 0.3 MG/DL — SIGNIFICANT CHANGE UP (ref 0.2–1.2)
BUN SERPL-MCNC: 12 MG/DL — SIGNIFICANT CHANGE UP (ref 7–23)
CALCIUM SERPL-MCNC: 9.7 MG/DL — SIGNIFICANT CHANGE UP (ref 8.4–10.5)
CHLORIDE SERPL-SCNC: 105 MMOL/L — SIGNIFICANT CHANGE UP (ref 96–108)
CO2 SERPL-SCNC: 24 MMOL/L — SIGNIFICANT CHANGE UP (ref 22–31)
CREAT SERPL-MCNC: 0.65 MG/DL — SIGNIFICANT CHANGE UP (ref 0.5–1.3)
EOSINOPHIL # BLD AUTO: 0.4 K/UL — SIGNIFICANT CHANGE UP (ref 0–0.5)
EOSINOPHIL NFR BLD AUTO: 4.7 % — SIGNIFICANT CHANGE UP (ref 0–6)
GLUCOSE SERPL-MCNC: 94 MG/DL — SIGNIFICANT CHANGE UP (ref 70–99)
HCG SERPL-ACNC: 1 MIU/ML — SIGNIFICANT CHANGE UP
HCT VFR BLD CALC: 36.2 % — SIGNIFICANT CHANGE UP (ref 34.5–45)
HGB BLD-MCNC: 11.6 G/DL — SIGNIFICANT CHANGE UP (ref 11.5–15.5)
IMM GRANULOCYTES NFR BLD AUTO: 0.2 % — SIGNIFICANT CHANGE UP (ref 0–1.5)
LYMPHOCYTES # BLD AUTO: 4.16 K/UL — HIGH (ref 1–3.3)
LYMPHOCYTES # BLD AUTO: 49.2 % — HIGH (ref 13–44)
MCHC RBC-ENTMCNC: 31.2 PG — SIGNIFICANT CHANGE UP (ref 27–34)
MCHC RBC-ENTMCNC: 32 GM/DL — SIGNIFICANT CHANGE UP (ref 32–36)
MCV RBC AUTO: 97.3 FL — SIGNIFICANT CHANGE UP (ref 80–100)
MONOCYTES # BLD AUTO: 0.54 K/UL — SIGNIFICANT CHANGE UP (ref 0–0.9)
MONOCYTES NFR BLD AUTO: 6.4 % — SIGNIFICANT CHANGE UP (ref 2–14)
NEUTROPHILS # BLD AUTO: 3.24 K/UL — SIGNIFICANT CHANGE UP (ref 1.8–7.4)
NEUTROPHILS NFR BLD AUTO: 38.4 % — LOW (ref 43–77)
NRBC # BLD: 0 /100 WBCS — SIGNIFICANT CHANGE UP (ref 0–0)
PLATELET # BLD AUTO: 290 K/UL — SIGNIFICANT CHANGE UP (ref 150–400)
POTASSIUM SERPL-MCNC: SIGNIFICANT CHANGE UP MMOL/L (ref 3.5–5.3)
POTASSIUM SERPL-SCNC: SIGNIFICANT CHANGE UP MMOL/L (ref 3.5–5.3)
PROT SERPL-MCNC: 7.8 G/DL — SIGNIFICANT CHANGE UP (ref 6–8.3)
RBC # BLD: 3.72 M/UL — LOW (ref 3.8–5.2)
RBC # FLD: 13 % — SIGNIFICANT CHANGE UP (ref 10.3–14.5)
SODIUM SERPL-SCNC: 140 MMOL/L — SIGNIFICANT CHANGE UP (ref 135–145)
WBC # BLD: 8.45 K/UL — SIGNIFICANT CHANGE UP (ref 3.8–10.5)
WBC # FLD AUTO: 8.45 K/UL — SIGNIFICANT CHANGE UP (ref 3.8–10.5)

## 2020-01-27 PROCEDURE — 76830 TRANSVAGINAL US NON-OB: CPT

## 2020-01-27 PROCEDURE — 36415 COLL VENOUS BLD VENIPUNCTURE: CPT

## 2020-01-27 PROCEDURE — 76830 TRANSVAGINAL US NON-OB: CPT | Mod: 26

## 2020-01-27 PROCEDURE — 85025 COMPLETE CBC W/AUTO DIFF WBC: CPT

## 2020-01-27 PROCEDURE — 99284 EMERGENCY DEPT VISIT MOD MDM: CPT

## 2020-01-27 PROCEDURE — 99285 EMERGENCY DEPT VISIT HI MDM: CPT

## 2020-01-27 PROCEDURE — 80053 COMPREHEN METABOLIC PANEL: CPT

## 2020-01-27 PROCEDURE — 76856 US EXAM PELVIC COMPLETE: CPT | Mod: 26

## 2020-01-27 PROCEDURE — 76856 US EXAM PELVIC COMPLETE: CPT

## 2020-01-27 PROCEDURE — 84702 CHORIONIC GONADOTROPIN TEST: CPT

## 2020-01-27 RX ORDER — ACETAMINOPHEN 500 MG
650 TABLET ORAL ONCE
Refills: 0 | Status: COMPLETED | OUTPATIENT
Start: 2020-01-27 | End: 2020-01-27

## 2020-01-27 RX ADMIN — Medication 650 MILLIGRAM(S): at 21:13

## 2020-01-27 NOTE — ED ADULT NURSE NOTE - CHPI ED NUR SYMPTOMS NEG
no diarrhea/no hematuria/no nausea/no dysuria/no abdominal distension/no blood in stool/no burning urination/no chills/no vomiting/no fever

## 2020-01-27 NOTE — ED ADULT NURSE REASSESSMENT NOTE - NS ED NURSE REASSESS COMMENT FT1
pt was observed leaving the department by triage RN, IV access was removed, pt had stated obgyn told her she could leave

## 2020-01-27 NOTE — CONSULT NOTE ADULT - ASSESSMENT
24y  known to GYN service for incomplete  treated with cytotec in December presenting to ED with complaints of lower pelvic pain for one day. Patient reports a pressure sensation when she urinates however denies dysuria, hematuria, frequency. TVUS significant for stable right 4.8cm ovarian dermoid cyst with no evidence of torsion. Patient counseled on R/A/B of management for ovarian cysts. Patient interested in surgical management. Patient to followup with Dr. James Willard minimally invasive surgeon for possible l/s cystectomy    d/w Dr. Leon

## 2020-01-27 NOTE — ED PROVIDER NOTE - PHYSICAL EXAMINATION
General: Patient is well developed and well nourised. Patient is alert and oriented to person, place and date. Patient is laying comfortably in stretcher and appears in no acute distress.  HEENT: Head is normocephalic and atraumatic. Pupils are equal, round and reactive. Extraocular movements intact. No evidence of nystagmus, conjunctival injection, or scleral icterus. External ears symmetric without evidence of discharge.  Nose is symmetric, non-tender, patent without evidence of discharge. Teeth in good repair. Uvula midline.   Neck: Supple with no evidence of lymphadenopathy.  Full range of motion.  Heart: Regular rate and rhythm. No murmurs, rubs or gallops.   Lungs: Clear to auscultation bilaterally with equal chest expansion. No note of wheezes, rhonchi, rales. Equal chest expansion. No note of retractions.  Abdomen: Bowel sounds present in all four quadrants. Soft, non-tender, non-distended without signs of masses, rebound or guarding. No note of hepatosplenomegaly. No CVA tenderness bilaterally. Negative Bear sign. No pain present over McBurney's point.  Neuro:  GCS 15. Moving all extremities without discomfort. Strength is 5/5 arms and legs bilaterally. Sensation intact in all four extremities. gait steady   Skin: Warm, dry and intact without evidence of rashes, bruising, pallor, jaundice or cyanosis.   Psych: Mood and affect appropriate.

## 2020-01-27 NOTE — CONSULT NOTE ADULT - SUBJECTIVE AND OBJECTIVE BOX
Patient evaluated at bedside.  24y  known with Last Menstrual Period 11/10 presenting with vaginal bleeding that started after a spontaneous IUD explosion yesterday.  2019 Placed a copper IUD, yesterday in the shower reports abdominal cramps after which vaginal bleeding with clots appeared requiring pad chage every 2 hours. This evening the bleeding subsided and still endorses cramps (tolerable).  Denies fever, chills, chest pain, palpitations, SOB, n/v.  +flatus, +BM    OB H/x:  G1 -    G2 - 2018 C/S  G3 - current    GYN H/x:  None  Copper IUD placed 2009    MED H/x:  None    SURG H/x:  2018 C/S    Medications:  None    Allergies:   NKDA      Patient denies fever, chills, chest pain, SOB, abdominal pain, nausea, vomiting, vaginal bleeding      OBHx:   GYN Hx:  PMHx:   SHx:  Meds:   Allergies:     PHYSICAL EXAM:   Vital Signs Last 24 Hrs  T(C): 36.8 (2020 18:05), Max: 36.8 (2020 18:05)  T(F): 98.2 (2020 18:05), Max: 98.2 (2020 18:05)  HR: 75 (2020 18:05) (75 - 75)  BP: 117/80 (2020 18:05) (117/80 - 117/80)  BP(mean): --  RR: 16 (2020 18:05) (16 - 16)  SpO2: 100% (2020 18:05) (100% - 100%)    **************************  Constitutional: Alert & Oriented x3, No acute distress  Respiratory: Clear to ausculation bilaterally; no wheezing, rhonchi, or crackles  Cardiovascular: regular rate and rhythm, no murmurs, or gallops  Gastrointestinal: soft, non tender, positive bowel sounds, no rebound or guarding   Pelvic exam:   Extremities: no calf tenderness or swelling      LABS:                        11.6   8.45  )-----------( 290      ( 2020 19:18 )             36.2         140  |  105  |  12  ----------------------------<  94  see note   |  24  |  0.65    Ca    9.7      2020 19:18    TPro  7.8  /  Alb  4.3  /  TBili  0.3  /  DBili  x   /  AST  see note  /  ALT  see note  /  AlkPhos  see note          HCG Quantitative, Serum: 1 mIU/mL ( @ 19:18)      RADIOLOGY & ADDITIONAL STUDIES: Patient evaluated at bedside.  24y  known to GYN service for incomplete  treated with cytotec in December presenting to ED with complaints of lower pelvic pain for one day. Patient reports a pressure sensation when she urinates however denies dysuria, hematuria, frequency.     Denies fever, chills, chest pain, palpitations, SOB, n/v.  +flatus, +BM    OB H/x:  G1 -    G2 - 2018 C/S  G3 - current    GYN H/x:  None  Copper IUD placed 2009    MED H/x:  None    SURG H/x:  2018 C/S    Medications:  None    Allergies:   NKDA          PHYSICAL EXAM:   Vital Signs Last 24 Hrs  T(C): 36.8 (2020 18:05), Max: 36.8 (2020 18:05)  T(F): 98.2 (2020 18:05), Max: 98.2 (2020 18:05)  HR: 75 (2020 18:05) (75 - 75)  BP: 117/80 (2020 18:05) (117/80 - 117/80)  BP(mean): --  RR: 16 (2020 18:05) (16 - 16)  SpO2: 100% (2020 18:05) (100% - 100%)    **************************  Constitutional: Alert & Oriented x3, No acute distress  Respiratory: Clear to ausculation bilaterally; no wheezing, rhonchi, or crackles  Cardiovascular: regular rate and rhythm, no murmurs, or gallops  Gastrointestinal: soft, non tender, positive bowel sounds, no rebound or guarding   Pelvic exam:   Extremities: no calf tenderness or swelling      LABS:                        11.6   8.45  )-----------( 290      ( 2020 19:18 )             36.2         140  |  105  |  12  ----------------------------<  94  see note   |  24  |  0.65    Ca    9.7      2020 19:18    TPro  7.8  /  Alb  4.3  /  TBili  0.3  /  DBili  x   /  AST  see note  /  ALT  see note  /  AlkPhos  see note          HCG Quantitative, Serum: 1 mIU/mL ( @ 19:18)      RADIOLOGY & ADDITIONAL STUDIES: Patient evaluated at bedside.  24y  known to GYN service for incomplete  treated with cytotec in December presenting to ED with complaints of lower pelvic pain for one day. Patient reports a pressure sensation when she urinates however denies dysuria, hematuria, frequency.     Denies fever, chills, chest pain, palpitations, SOB, n/v.  +flatus, +BM    OB H/x:  G1 -    G2 - 2018 C/S  G3 - current    GYN H/x:  None  Copper IUD placed 2009    MED H/x:  None    SURG H/x:  2018 C/S    Medications:  None    Allergies:   NKDA          PHYSICAL EXAM:   Vital Signs Last 24 Hrs  T(C): 36.8 (2020 18:05), Max: 36.8 (2020 18:05)  T(F): 98.2 (2020 18:05), Max: 98.2 (2020 18:05)  HR: 75 (2020 18:05) (75 - 75)  BP: 117/80 (2020 18:05) (117/80 - 117/80)  BP(mean): --  RR: 16 (2020 18:05) (16 - 16)  SpO2: 100% (2020 18:05) (100% - 100%)    **************************  Constitutional: Alert & Oriented x3  Respiratory: Clear to ausculation bilaterally  Cardiovascular: regular rate and rhythm  Gastrointestinal: soft, mild tenderness to palpation over RLQ, positive bowel sounds, no rebound or guarding   Pelvic exam: mild TTP over right adnexal, no adnexal fullness, no CMT, no vaginal bleeding   Extremities: no calf tenderness or swelling      LABS:                        11.6   8.45  )-----------( 290      ( 2020 19:18 )             36.2         140  |  105  |  12  ----------------------------<  94  see note   |  24  |  0.65    Ca    9.7      2020 19:18    TPro  7.8  /  Alb  4.3  /  TBili  0.3  /  DBili  x   /  AST  see note  /  ALT  see note  /  AlkPhos  see note          HCG Quantitative, Serum: 1 mIU/mL ( @ 19:18)      RADIOLOGY & ADDITIONAL STUDIES:  < from: US Transvaginal (20 @ 19:52) >    IMPRESSION:   There is again a complex cystic structure in the right ovary suggestiveof a dermoid cyst. The left ovary appears echogenic as well, better seen on this study and is nonspecific, but may represent a smaller dermoid cyst. An MRI is recommended for confirmation of these findings.        < end of copied text >

## 2020-01-27 NOTE — CONSULT NOTE ADULT - ATTENDING COMMENTS
Agree with plan to discharge and work up on outpatient bases.   Plan for likely surgical evaluation with faculty practice. names and address/contact information given.

## 2020-01-27 NOTE — ED PROVIDER NOTE - CLINICAL SUMMARY MEDICAL DECISION MAKING FREE TEXT BOX
23 y/o F PMHx miscarriage 1 M ago p/w pelvic pain x 1 W. Pt instructed to come back to ED for reevaluation for any presentation for pelvic pain. On exam, pt appears well and nontoxic w/ a soft and nontender abdomen. 23 y/o F PMHx miscarriage 1 M ago p/w pelvic pain x 1 W. Pt instructed to come back to ED for reevaluation for any presentation for pelvic pain. On exam, pt appears well and nontoxic w/ a soft and nontender abdomen. labs reviewed, quant 1. us shows possible dermoid. gyn consulted, given recent evaluation for miscarraige. disposition pending gyn recommendations

## 2020-01-27 NOTE — ED ADULT NURSE NOTE - OBJECTIVE STATEMENT
pt is 25 y/o F with c/o lower abdominal pain for several days. pt states pain exacerbated with eating spicy food. denies n,v,d, fever. NAD noted, will continue to monitor.

## 2020-01-27 NOTE — ED PROVIDER NOTE - NSFOLLOWUPINSTRUCTIONS_ED_ALL_ED_FT
Log Out.    [employer] CareNotes®     :  [facility]             PELVIC PAIN - AfterCare(R) Instructions(ER/ED)     Pelvic Pain    WHAT YOU NEED TO KNOW:    Pelvic pain may be caused by a number of conditions, such as irritable bowel syndrome, appendicitis, or constipation. A urinary tract infection, prostate inflammation, menstrual cramps, or kidney stones can also cause pelvic pain. You may have pain on one or both sides of your pelvis. Pelvic pain can develop if you have trauma to your pelvis or if you sit or stand for a long time.     DISCHARGE INSTRUCTIONS:    Medicines: You may need any of the following:     NSAIDs, such as ibuprofen, help decrease swelling, pain, and fever. This medicine is available with or without a doctor's order. NSAIDs can cause stomach bleeding or kidney problems in certain people. If you take blood thinner medicine, always ask your healthcare provider if NSAIDs are safe for you. Always read the medicine label and follow directions.      Prescription pain medicine may be given. Ask how to take this medicine safely.      Birth control medicines may help decrease pain in women.      Take your medicine as directed. Contact your healthcare provider if you think your medicine is not helping or if you have side effects. Tell him or her if you are allergic to any medicine. Keep a list of the medicines, vitamins, and herbs you take. Include the amounts, and when and why you take them. Bring the list or the pill bottles to follow-up visits. Carry your medicine list with you in case of an emergency.    Call 911 for any of the following:     You have severe chest pain and sudden trouble breathing.        Contact your healthcare provider if:     You have a fever.      You have nausea, vomiting, or diarrhea.      Your pain does not go away, or it gets worse, even after treatment.      You have numbness in your legs or toes.      You have heavy or unusual vaginal bleeding.      You have questions or concerns about your condition or care.    Manage your pelvic pain:     Keep a pain record. Write down when your pain happens and how severe it is. Include any other symptoms you have with your pain. A record will help you keep track of pain cycles. Bring the record with you to your follow-up visits. It may also help your healthcare provider find out what is causing your pain.      Learn ways to relax. Deep breathing, meditation, and relaxation techniques can help decrease your pain. When you are tense, your pain may increase.      Treat or prevent constipation. Drink liquids as directed. You may need to drink more liquid than usual. Ask your healthcare provider how much liquid to drink each day. Eat high-fiber foods. High-fiber foods include fruit, vegetables, whole-grain breads and cereals, and beans. You may need to change the foods you eat if you have irritable bowel syndrome.    Follow up with your healthcare provider as directed: You may need physical therapy. You may need to see an orthopedic specialist. Write down your questions so you remember to ask them during your visits.       © Copyright [employer] 2020       back to top                      © Copyright [employer] 2020

## 2020-02-25 ENCOUNTER — EMERGENCY (EMERGENCY)
Facility: HOSPITAL | Age: 25
LOS: 1 days | Discharge: HOME | End: 2020-02-25
Attending: EMERGENCY MEDICINE | Admitting: EMERGENCY MEDICINE
Payer: MEDICAID

## 2020-02-25 VITALS
WEIGHT: 169.98 LBS | DIASTOLIC BLOOD PRESSURE: 75 MMHG | TEMPERATURE: 98 F | HEART RATE: 73 BPM | OXYGEN SATURATION: 97 % | RESPIRATION RATE: 18 BRPM | SYSTOLIC BLOOD PRESSURE: 111 MMHG

## 2020-02-25 VITALS
DIASTOLIC BLOOD PRESSURE: 65 MMHG | HEART RATE: 70 BPM | TEMPERATURE: 98 F | RESPIRATION RATE: 16 BRPM | SYSTOLIC BLOOD PRESSURE: 119 MMHG | OXYGEN SATURATION: 98 %

## 2020-02-25 LAB
ALBUMIN SERPL ELPH-MCNC: 4.1 G/DL — SIGNIFICANT CHANGE UP (ref 3.3–5)
ALP SERPL-CCNC: 42 U/L — SIGNIFICANT CHANGE UP (ref 40–120)
ALT FLD-CCNC: 11 U/L — SIGNIFICANT CHANGE UP (ref 10–45)
ANION GAP SERPL CALC-SCNC: 11 MMOL/L — SIGNIFICANT CHANGE UP (ref 5–17)
APPEARANCE UR: ABNORMAL
APTT BLD: 28.7 SEC — SIGNIFICANT CHANGE UP (ref 27.5–36.3)
AST SERPL-CCNC: 23 U/L — SIGNIFICANT CHANGE UP (ref 10–40)
BASOPHILS # BLD AUTO: 0.06 K/UL — SIGNIFICANT CHANGE UP (ref 0–0.2)
BASOPHILS NFR BLD AUTO: 0.9 % — SIGNIFICANT CHANGE UP (ref 0–2)
BILIRUB SERPL-MCNC: 0.3 MG/DL — SIGNIFICANT CHANGE UP (ref 0.2–1.2)
BILIRUB UR-MCNC: NEGATIVE — SIGNIFICANT CHANGE UP
BUN SERPL-MCNC: 7 MG/DL — SIGNIFICANT CHANGE UP (ref 7–23)
CALCIUM SERPL-MCNC: 8.9 MG/DL — SIGNIFICANT CHANGE UP (ref 8.4–10.5)
CHLORIDE SERPL-SCNC: 108 MMOL/L — SIGNIFICANT CHANGE UP (ref 96–108)
CO2 SERPL-SCNC: 25 MMOL/L — SIGNIFICANT CHANGE UP (ref 22–31)
COLOR SPEC: YELLOW — SIGNIFICANT CHANGE UP
CREAT SERPL-MCNC: 0.73 MG/DL — SIGNIFICANT CHANGE UP (ref 0.5–1.3)
DIFF PNL FLD: NEGATIVE — SIGNIFICANT CHANGE UP
EOSINOPHIL # BLD AUTO: 0.35 K/UL — SIGNIFICANT CHANGE UP (ref 0–0.5)
EOSINOPHIL NFR BLD AUTO: 5.5 % — SIGNIFICANT CHANGE UP (ref 0–6)
GLUCOSE SERPL-MCNC: 112 MG/DL — HIGH (ref 70–99)
GLUCOSE UR QL: NEGATIVE — SIGNIFICANT CHANGE UP
HCG SERPL-ACNC: <0 MIU/ML — SIGNIFICANT CHANGE UP
HCT VFR BLD CALC: 33.6 % — LOW (ref 34.5–45)
HGB BLD-MCNC: 11.2 G/DL — LOW (ref 11.5–15.5)
IMM GRANULOCYTES NFR BLD AUTO: 0.2 % — SIGNIFICANT CHANGE UP (ref 0–1.5)
INR BLD: 1.11 — SIGNIFICANT CHANGE UP (ref 0.88–1.16)
KETONES UR-MCNC: NEGATIVE — SIGNIFICANT CHANGE UP
LEUKOCYTE ESTERASE UR-ACNC: ABNORMAL
LYMPHOCYTES # BLD AUTO: 3.22 K/UL — SIGNIFICANT CHANGE UP (ref 1–3.3)
LYMPHOCYTES # BLD AUTO: 50.9 % — HIGH (ref 13–44)
MCHC RBC-ENTMCNC: 31.6 PG — SIGNIFICANT CHANGE UP (ref 27–34)
MCHC RBC-ENTMCNC: 33.3 GM/DL — SIGNIFICANT CHANGE UP (ref 32–36)
MCV RBC AUTO: 94.9 FL — SIGNIFICANT CHANGE UP (ref 80–100)
MONOCYTES # BLD AUTO: 0.63 K/UL — SIGNIFICANT CHANGE UP (ref 0–0.9)
MONOCYTES NFR BLD AUTO: 10 % — SIGNIFICANT CHANGE UP (ref 2–14)
NEUTROPHILS # BLD AUTO: 2.05 K/UL — SIGNIFICANT CHANGE UP (ref 1.8–7.4)
NEUTROPHILS NFR BLD AUTO: 32.5 % — LOW (ref 43–77)
NITRITE UR-MCNC: NEGATIVE — SIGNIFICANT CHANGE UP
NRBC # BLD: 0 /100 WBCS — SIGNIFICANT CHANGE UP (ref 0–0)
PH UR: 7 — SIGNIFICANT CHANGE UP (ref 5–8)
PLATELET # BLD AUTO: 263 K/UL — SIGNIFICANT CHANGE UP (ref 150–400)
POTASSIUM SERPL-MCNC: 4 MMOL/L — SIGNIFICANT CHANGE UP (ref 3.5–5.3)
POTASSIUM SERPL-SCNC: 4 MMOL/L — SIGNIFICANT CHANGE UP (ref 3.5–5.3)
PROT SERPL-MCNC: 7.3 G/DL — SIGNIFICANT CHANGE UP (ref 6–8.3)
PROT UR-MCNC: NEGATIVE MG/DL — SIGNIFICANT CHANGE UP
PROTHROM AB SERPL-ACNC: 12.7 SEC — SIGNIFICANT CHANGE UP (ref 10–12.9)
RBC # BLD: 3.54 M/UL — LOW (ref 3.8–5.2)
RBC # FLD: 12.7 % — SIGNIFICANT CHANGE UP (ref 10.3–14.5)
SODIUM SERPL-SCNC: 144 MMOL/L — SIGNIFICANT CHANGE UP (ref 135–145)
SP GR SPEC: 1.01 — SIGNIFICANT CHANGE UP (ref 1–1.03)
UROBILINOGEN FLD QL: 0.2 E.U./DL — SIGNIFICANT CHANGE UP
WBC # BLD: 6.32 K/UL — SIGNIFICANT CHANGE UP (ref 3.8–10.5)
WBC # FLD AUTO: 6.32 K/UL — SIGNIFICANT CHANGE UP (ref 3.8–10.5)

## 2020-02-25 PROCEDURE — 85025 COMPLETE CBC W/AUTO DIFF WBC: CPT

## 2020-02-25 PROCEDURE — 84702 CHORIONIC GONADOTROPIN TEST: CPT

## 2020-02-25 PROCEDURE — 81001 URINALYSIS AUTO W/SCOPE: CPT

## 2020-02-25 PROCEDURE — 99283 EMERGENCY DEPT VISIT LOW MDM: CPT

## 2020-02-25 PROCEDURE — 85610 PROTHROMBIN TIME: CPT

## 2020-02-25 PROCEDURE — 99284 EMERGENCY DEPT VISIT MOD MDM: CPT

## 2020-02-25 PROCEDURE — 85730 THROMBOPLASTIN TIME PARTIAL: CPT

## 2020-02-25 PROCEDURE — 80053 COMPREHEN METABOLIC PANEL: CPT

## 2020-02-25 PROCEDURE — 36415 COLL VENOUS BLD VENIPUNCTURE: CPT

## 2020-02-25 RX ORDER — SODIUM CHLORIDE 9 MG/ML
1000 INJECTION INTRAMUSCULAR; INTRAVENOUS; SUBCUTANEOUS ONCE
Refills: 0 | Status: COMPLETED | OUTPATIENT
Start: 2020-02-25 | End: 2020-02-25

## 2020-02-25 RX ADMIN — SODIUM CHLORIDE 1000 MILLILITER(S): 9 INJECTION INTRAMUSCULAR; INTRAVENOUS; SUBCUTANEOUS at 15:40

## 2020-02-25 NOTE — ED PROVIDER NOTE - PATIENT PORTAL LINK FT
You can access the FollowMyHealth Patient Portal offered by [facility] by registering at the following website: [email or web address]. By joining [employer]’s FollowMyHealth portal, you will also be able to view your health information using other applications (apps) compatible with our system.

## 2020-02-25 NOTE — ED PROVIDER NOTE - OBJECTIVE STATEMENT
24 year old female with no known past medical history presents to ED with concern for feeling lightheaded over the past 4 days.  Patient denies LOC or near syncope.  She states symptoms seem to improve when she eats.  She denies associated fever, chills, chest pain, shortness of breath, visual changes, abdominal pain, nausea, emesis, changes to bowel movements, peripheral edema, extremity pain, rashes, recent travel or any additional acute complaints or concerns at this time.

## 2020-02-25 NOTE — ED PROVIDER NOTE - NSFOLLOWUPCLINICS_GEN_ALL_ED_FT
[facility]  Family Medicine  [facility]  [place], [place] [facility]  Phone: [phone]  Fax:   Follow Up Time:

## 2020-02-25 NOTE — ED PROVIDER NOTE - PHYSICAL EXAMINATION
VITAL SIGNS: I have reviewed nursing notes and confirm.  CONSTITUTIONAL: Well-developed; well-nourished; in no acute distress.   SKIN:  warm and dry, no acute rash.   HEAD:  normocephalic, atraumatic.  EYES: PERRL.  EOM intact; conjunctiva and sclera clear.  ENT: No nasal discharge; airway clear.   NECK: Supple; non tender.  CARD: S1, S2 normal; no murmurs, gallops, or rubs. Regular rate and rhythm.   RESP:  Clear to auscultation b/l, no wheezes, rales or rhonchi.  ABD: Normal bowel sounds; soft; non-distended; non-tender; no guarding/ rebound.  EXT: Normal ROM. No clubbing, cyanosis or edema. 2+ pulses to b/l ue/le.  NEURO: Alert, oriented, grossly unremarkable.  5/5 strength x 4 extremities against gravity and external force.  Sensation intact and symmetric x 4 extremities.    PSYCH: Cooperative, mood and affect appropriate.

## 2020-02-25 NOTE — ED PROVIDER NOTE - NS ED ROS FT
Constitutional: No fever or chills.   Eyes: No pain, blurry vision, or discharge.  ENMT: No hearing changes, pain, discharge or infections. No neck pain or stiffness.  Cardiac: No chest pain, SOB or edema. No chest pain with exertion.  Respiratory: No cough or respiratory distress. No hemoptysis. No history of asthma or RAD.  GI: No nausea, vomiting, diarrhea or abdominal pain.  : No dysuria, frequency or burning.  MS: No myalgia, muscle weakness, joint pain or back pain.  Neuro: + Lightheadedness.  No headache or weakness. No LOC.  Skin: No skin rash.   Endocrine: No history of thyroid disease or diabetes.  Except as documented in the HPI, all other systems are negative.

## 2020-02-25 NOTE — ED PROVIDER NOTE - CLINICAL SUMMARY MEDICAL DECISION MAKING FREE TEXT BOX
Patient presents to ED with concern for feeling lightheaded over the past few days.  VSS.  Benign physical exam without focal neuro deficits.  Patient given IVF bolus, basic labs reviewed and discussed with patient.  UA likely contaminated.  Will hold off on treatment given no urinary symptoms.  Patient is aware of all results and recommendation for prompt PCP follow up.  Patient is advised to follow up with their PCP in 1-2 days without fail.  Patient instructed to return to ED immediately should their symptoms worsen or if there is any concern prior to the recommended PCP follow up.  Patient is aware of plan and verbalizes their understanding.  Will discharge home at this time.

## 2020-02-25 NOTE — ED PROVIDER NOTE - NSFOLLOWUPINSTRUCTIONS_ED_ALL_ED_FT
Please follow up with your primary physician in 1-2 days for re evaluation.  Please return to ER immediately should your symptoms worsen or if you have any concern prior to this recommended follow up.    Lightheadedness    WHAT YOU NEED TO KNOW:    Lightheadedness is the feeling that you may faint, but you do not. Your heartbeat may be fast or feel like it flutters. Lightheadedness may occur when you take certain medicines, such as medicine to lower your blood pressure. Dehydration, low sodium, low blood sugar, an abnormal heart rhythm, and anxiety are other common causes.     DISCHARGE INSTRUCTIONS:    Return to the emergency department if:     You have sudden chest pain.       You have trouble breathing or shortness of breath.       You have vision changes, are sweating, and have nausea while you are sitting or lying down.       You feel flushed and your heart is fluttering.      You faint.     Contact your healthcare provider if:     You feel lightheaded often.       Your heart beats faster or slower than usual.       You have questions or concerns about your condition or care.    Follow up with your healthcare provider as directed: You may need more tests to help find the cause of your lightheadedness. The tests will help healthcare providers plan the best treatment for you. Write down your questions so you remember to ask them during your visits.     Self-care: Talk with your healthcare provider about these and other ways to manage your symptoms:    Lie down when you feel lightheaded, your throat gets tight, or your vision changes. Raise your legs above the level of your heart.      Stand up slowly. Sit on the side of the bed or couch for a few minutes before you stand up.       Take slow, deep breaths when you feel lightheaded. This can help decrease the feeling that you might faint.       Ask if you need to avoid hot baths and saunas. These may make your symptoms worse.     Watch for signs of low blood sugar: These include hunger, nervousness, sweating, and fast or fluttery heartbeats. Talk with your healthcare provider about ways to keep your blood sugar level steady.    Check your blood pressure often: You should do this especially if you take medicine to lower your blood pressure. Check your blood pressure when you are lying down and when you are standing. Ask how often to check during the day. Keep a record of your blood pressure numbers. Your healthcare provider may use the record to help plan your treatment.    Keep a record of your lightheadedness episodes: Include your symptoms and your activity before and after the episode. The record can help your healthcare provider find the cause of your lightheadedness and help you manage episodes.       © Copyright [employer] 2020       back to top                      © Copyright [employer] 2020

## 2020-02-25 NOTE — ED ADULT NURSE NOTE - OBJECTIVE STATEMENT
24 y.o F a&ox4 walked in from front triage c.o dizziness. x 4 days of dizziness and subjective fevers. LMP Feb 3. unknown pregnancy status. denies n/v/d, CP, SOB, change in urination, change in BM. speaking in clear appropriate sentences, airway patent.

## 2020-02-29 DIAGNOSIS — R42 DIZZINESS AND GIDDINESS: ICD-10-CM

## 2020-02-29 DIAGNOSIS — Z91.040 LATEX ALLERGY STATUS: ICD-10-CM

## 2020-06-04 ENCOUNTER — EMERGENCY (EMERGENCY)
Facility: HOSPITAL | Age: 25
LOS: 1 days | Discharge: HOME | End: 2020-06-04
Attending: EMERGENCY MEDICINE | Admitting: EMERGENCY MEDICINE
Payer: COMMERCIAL

## 2020-06-04 VITALS
DIASTOLIC BLOOD PRESSURE: 65 MMHG | OXYGEN SATURATION: 99 % | TEMPERATURE: 98 F | HEIGHT: 67 IN | WEIGHT: 164.02 LBS | RESPIRATION RATE: 18 BRPM | SYSTOLIC BLOOD PRESSURE: 103 MMHG | HEART RATE: 89 BPM

## 2020-06-04 VITALS
HEART RATE: 80 BPM | RESPIRATION RATE: 18 BRPM | DIASTOLIC BLOOD PRESSURE: 71 MMHG | SYSTOLIC BLOOD PRESSURE: 113 MMHG | TEMPERATURE: 98 F | OXYGEN SATURATION: 100 %

## 2020-06-04 DIAGNOSIS — Z3A.16 16 WEEKS GESTATION OF PREGNANCY: ICD-10-CM

## 2020-06-04 DIAGNOSIS — O26.892 OTHER SPECIFIED PREGNANCY RELATED CONDITIONS, SECOND TRIMESTER: ICD-10-CM

## 2020-06-04 DIAGNOSIS — R10.2 PELVIC AND PERINEAL PAIN: ICD-10-CM

## 2020-06-04 DIAGNOSIS — Z91.040 LATEX ALLERGY STATUS: ICD-10-CM

## 2020-06-04 DIAGNOSIS — Z98.891 HISTORY OF UTERINE SCAR FROM PREVIOUS SURGERY: Chronic | ICD-10-CM

## 2020-06-04 LAB
ALBUMIN SERPL ELPH-MCNC: 3.9 G/DL — SIGNIFICANT CHANGE UP (ref 3.3–5)
ALP SERPL-CCNC: SIGNIFICANT CHANGE UP U/L (ref 40–120)
ALT FLD-CCNC: SIGNIFICANT CHANGE UP U/L (ref 10–45)
ANION GAP SERPL CALC-SCNC: 10 MMOL/L — SIGNIFICANT CHANGE UP (ref 5–17)
APPEARANCE UR: CLEAR — SIGNIFICANT CHANGE UP
AST SERPL-CCNC: SIGNIFICANT CHANGE UP U/L (ref 10–40)
BACTERIA # UR AUTO: PRESENT /HPF
BASOPHILS # BLD AUTO: 0.04 K/UL — SIGNIFICANT CHANGE UP (ref 0–0.2)
BASOPHILS NFR BLD AUTO: 0.5 % — SIGNIFICANT CHANGE UP (ref 0–2)
BILIRUB SERPL-MCNC: 0.4 MG/DL — SIGNIFICANT CHANGE UP (ref 0.2–1.2)
BILIRUB UR-MCNC: NEGATIVE — SIGNIFICANT CHANGE UP
BUN SERPL-MCNC: 4 MG/DL — LOW (ref 7–23)
CALCIUM SERPL-MCNC: 8.9 MG/DL — SIGNIFICANT CHANGE UP (ref 8.4–10.5)
CHLORIDE SERPL-SCNC: 105 MMOL/L — SIGNIFICANT CHANGE UP (ref 96–108)
CO2 SERPL-SCNC: 20 MMOL/L — LOW (ref 22–31)
COLOR SPEC: YELLOW — SIGNIFICANT CHANGE UP
COMMENT - URINE: SIGNIFICANT CHANGE UP
COMMENT - URINE: SIGNIFICANT CHANGE UP
CREAT SERPL-MCNC: 0.48 MG/DL — LOW (ref 0.5–1.3)
DIFF PNL FLD: NEGATIVE — SIGNIFICANT CHANGE UP
EOSINOPHIL # BLD AUTO: 0.15 K/UL — SIGNIFICANT CHANGE UP (ref 0–0.5)
EOSINOPHIL NFR BLD AUTO: 1.9 % — SIGNIFICANT CHANGE UP (ref 0–6)
EPI CELLS # UR: ABNORMAL /HPF (ref 0–5)
GLUCOSE SERPL-MCNC: 92 MG/DL — SIGNIFICANT CHANGE UP (ref 70–99)
GLUCOSE UR QL: NEGATIVE — SIGNIFICANT CHANGE UP
HCG SERPL-ACNC: HIGH MIU/ML
HCT VFR BLD CALC: 33.9 % — LOW (ref 34.5–45)
HGB BLD-MCNC: 11.4 G/DL — LOW (ref 11.5–15.5)
IMM GRANULOCYTES NFR BLD AUTO: 0.4 % — SIGNIFICANT CHANGE UP (ref 0–1.5)
KETONES UR-MCNC: NEGATIVE — SIGNIFICANT CHANGE UP
LEUKOCYTE ESTERASE UR-ACNC: NEGATIVE — SIGNIFICANT CHANGE UP
LIDOCAIN IGE QN: 20 U/L — SIGNIFICANT CHANGE UP (ref 7–60)
LYMPHOCYTES # BLD AUTO: 1.59 K/UL — SIGNIFICANT CHANGE UP (ref 1–3.3)
LYMPHOCYTES # BLD AUTO: 19.7 % — SIGNIFICANT CHANGE UP (ref 13–44)
MCHC RBC-ENTMCNC: 31.3 PG — SIGNIFICANT CHANGE UP (ref 27–34)
MCHC RBC-ENTMCNC: 33.6 GM/DL — SIGNIFICANT CHANGE UP (ref 32–36)
MCV RBC AUTO: 93.1 FL — SIGNIFICANT CHANGE UP (ref 80–100)
MONOCYTES # BLD AUTO: 0.51 K/UL — SIGNIFICANT CHANGE UP (ref 0–0.9)
MONOCYTES NFR BLD AUTO: 6.3 % — SIGNIFICANT CHANGE UP (ref 2–14)
NEUTROPHILS # BLD AUTO: 5.74 K/UL — SIGNIFICANT CHANGE UP (ref 1.8–7.4)
NEUTROPHILS NFR BLD AUTO: 71.2 % — SIGNIFICANT CHANGE UP (ref 43–77)
NITRITE UR-MCNC: NEGATIVE — SIGNIFICANT CHANGE UP
NRBC # BLD: 0 /100 WBCS — SIGNIFICANT CHANGE UP (ref 0–0)
PH UR: 8 — SIGNIFICANT CHANGE UP (ref 5–8)
PLATELET # BLD AUTO: 242 K/UL — SIGNIFICANT CHANGE UP (ref 150–400)
POTASSIUM SERPL-MCNC: SIGNIFICANT CHANGE UP MMOL/L (ref 3.5–5.3)
POTASSIUM SERPL-SCNC: SIGNIFICANT CHANGE UP MMOL/L (ref 3.5–5.3)
PROT SERPL-MCNC: 7.5 G/DL — SIGNIFICANT CHANGE UP (ref 6–8.3)
PROT UR-MCNC: ABNORMAL MG/DL
RBC # BLD: 3.64 M/UL — LOW (ref 3.8–5.2)
RBC # FLD: 13.3 % — SIGNIFICANT CHANGE UP (ref 10.3–14.5)
RBC CASTS # UR COMP ASSIST: < 5 /HPF — SIGNIFICANT CHANGE UP
SODIUM SERPL-SCNC: 135 MMOL/L — SIGNIFICANT CHANGE UP (ref 135–145)
SP GR SPEC: 1.02 — SIGNIFICANT CHANGE UP (ref 1–1.03)
UROBILINOGEN FLD QL: 1 E.U./DL — SIGNIFICANT CHANGE UP
WBC # BLD: 8.06 K/UL — SIGNIFICANT CHANGE UP (ref 3.8–10.5)
WBC # FLD AUTO: 8.06 K/UL — SIGNIFICANT CHANGE UP (ref 3.8–10.5)
WBC UR QL: < 5 /HPF — SIGNIFICANT CHANGE UP

## 2020-06-04 PROCEDURE — 76805 OB US >/= 14 WKS SNGL FETUS: CPT

## 2020-06-04 PROCEDURE — 86901 BLOOD TYPING SEROLOGIC RH(D): CPT

## 2020-06-04 PROCEDURE — 81001 URINALYSIS AUTO W/SCOPE: CPT

## 2020-06-04 PROCEDURE — 87086 URINE CULTURE/COLONY COUNT: CPT

## 2020-06-04 PROCEDURE — 85025 COMPLETE CBC W/AUTO DIFF WBC: CPT

## 2020-06-04 PROCEDURE — 84702 CHORIONIC GONADOTROPIN TEST: CPT

## 2020-06-04 PROCEDURE — 36415 COLL VENOUS BLD VENIPUNCTURE: CPT

## 2020-06-04 PROCEDURE — 80053 COMPREHEN METABOLIC PANEL: CPT

## 2020-06-04 PROCEDURE — 76817 TRANSVAGINAL US OBSTETRIC: CPT | Mod: 26

## 2020-06-04 PROCEDURE — 99284 EMERGENCY DEPT VISIT MOD MDM: CPT | Mod: 25

## 2020-06-04 PROCEDURE — 99285 EMERGENCY DEPT VISIT HI MDM: CPT

## 2020-06-04 PROCEDURE — 86850 RBC ANTIBODY SCREEN: CPT

## 2020-06-04 PROCEDURE — 76817 TRANSVAGINAL US OBSTETRIC: CPT

## 2020-06-04 PROCEDURE — 76805 OB US >/= 14 WKS SNGL FETUS: CPT | Mod: 26

## 2020-06-04 PROCEDURE — 83690 ASSAY OF LIPASE: CPT

## 2020-06-04 NOTE — ED PROVIDER NOTE - ATTENDING CONTRIBUTION TO CARE
25 y/o female, 16 weeks pregnant who presents with c/o bilateral abd pain. No vag bleed. currently in ED pain free. H/H WNL. IUP on US. d/c to home.

## 2020-06-04 NOTE — ED PROVIDER NOTE - PHYSICAL EXAMINATION
Vitals reviewed  Gen: well appearing, nad, speaking in full sentences  Skin: wwp, no rash/lesions  HEENT: ncat, eomi, mmm  CV: rrr, no audible m/r/g  Resp: symmetrical expansion, ctab, no w/r/r  Abd: gravid, uterus 3 finger widths below umbilicus, mild suprapubic tenderness, no rebound, no guarding, no CVAT.  Ext: FROM throughout, no peripheral edema  Neuro: alert/oriented, no focal deficits, steady gait

## 2020-06-04 NOTE — ED PROVIDER NOTE - OBJECTIVE STATEMENT
23 y/o F, , currently 16w pregnant, presents to the ED c/o abdominal pain since 9am. Pt states she has intermittent abdominal cramping that radiates from side to side; pain lasted an hour then resolved completely. Denies vaginal bleeding/discharge, or urinary symptoms. Denies f/c/n/v/d or trauma to the area. LMP 2/. Pt follows with an outside OB and reports no complications with her prior pregnancies. 25 y/o F, , currently 16w pregnant, presents to the ED c/o pelvic pain since 9am. Pt states she has intermittent pelvic cramping that radiates from side to side; pain lasted an hour total then resolved completely. Denies vaginal bleeding/discharge, urinary symptoms and flank pain. Denies f/c/n/v/d or trauma to the area. LMP 2/9. Pt follows with an outside OB and reports no complications with her prior pregnancies.

## 2020-06-04 NOTE — ED PROVIDER NOTE - NSFOLLOWUPINSTRUCTIONS_ED_ALL_ED_FT
Abdominal Pain During Pregnancy     Belly (abdominal) pain is common during pregnancy. There are many possible causes. Most of the time, it is not a serious problem. Other times, it can be a sign that something is wrong with the pregnancy. Always tell your doctor if you have belly pain.  Follow these instructions at home:  Do not have sex or put anything in your vagina until your pain goes away completely.Get plenty of rest until your pain gets better.Drink enough fluid to keep your pee (urine) pale yellow.Take over-the-counter and prescription medicines only as told by your doctor.Keep all follow-up visits as told by your doctor. This is important.Contact a doctor if:  Your pain continues or gets worse after resting.You have lower belly pain that:  Comes and goes at regular times.Spreads to your back.Feels like menstrual cramps.You have pain or burning when you pee (urinate).Get help right away if:  You have a fever or chills.You have vaginal bleeding.You are leaking fluid from your vagina.You are passing tissue from your vagina.You throw up (vomit) for more than 24 hours.You have watery poop (diarrhea) for more than 24 hours.Your baby is moving less than usual.You feel very weak or faint.You have shortness of breath.You have very bad pain in your upper belly.Summary  Belly (abdominal) pain is common during pregnancy. There are many possible causes.If you have belly pain during pregnancy, tell your doctor right away.Keep all follow-up visits as told by your doctor. This is important.This information is not intended to replace advice given to you by your health care provider. Make sure you discuss any questions you have with your health care provider. Please call to schedule follow up with OB doctor within one week.  Return to ED for worsening pain, vaginal bleeding or discharge, dizziness, fainting, vomiting     Abdominal Pain During Pregnancy     Belly (abdominal) pain is common during pregnancy. There are many possible causes. Most of the time, it is not a serious problem. Other times, it can be a sign that something is wrong with the pregnancy. Always tell your doctor if you have belly pain.  Follow these instructions at home:  Do not have sex or put anything in your vagina until your pain goes away completely.Get plenty of rest until your pain gets better.Drink enough fluid to keep your pee (urine) pale yellow.Take over-the-counter and prescription medicines only as told by your doctor.Keep all follow-up visits as told by your doctor. This is important.Contact a doctor if:  Your pain continues or gets worse after resting.You have lower belly pain that:  Comes and goes at regular times.Spreads to your back.Feels like menstrual cramps.You have pain or burning when you pee (urinate).Get help right away if:  You have a fever or chills.You have vaginal bleeding.You are leaking fluid from your vagina.You are passing tissue from your vagina.You throw up (vomit) for more than 24 hours.You have watery poop (diarrhea) for more than 24 hours.Your baby is moving less than usual.You feel very weak or faint.You have shortness of breath.You have very bad pain in your upper belly.Summary  Belly (abdominal) pain is common during pregnancy. There are many possible causes.If you have belly pain during pregnancy, tell your doctor right away.Keep all follow-up visits as told by your doctor. This is important.This information is not intended to replace advice given to you by your health care provider. Make sure you discuss any questions you have with your health care provider.

## 2020-06-04 NOTE — ED PROVIDER NOTE - PATIENT PORTAL LINK FT
You can access the FollowMyHealth Patient Portal offered by [facility] by registering at the following website: [email or web address]. By joining [employer]’s FollowMyHealth portal, you will also be able to view your health information using other applications (apps) compatible with our system.

## 2020-06-04 NOTE — ED ADULT NURSE NOTE - OBJECTIVE STATEMENT
A0 ~ 16 weeks gravid c/o lower abdominal pain radiating to upper abdomen that started suddenly this morning at ~09:00. Pain is described as intermittent and cramping like pain. Denies vaginal bleeding/discharge, fever, chills, or dysuria. Denies complications from prior pregnancies.

## 2020-06-04 NOTE — ED PROVIDER NOTE - CLINICAL SUMMARY MEDICAL DECISION MAKING FREE TEXT BOX
25 y/o F, , currently 16w pregnant, presents to the ED c/o pelvic pain since 9am- no bleeding, mild suprapubic ttp on exam, no peritoneal sxs.  labs reviewed; hcg [id number], Rh+, UA no gross infection.  sonogram shows 16wk 6 day IUP, b/l stable dermoid cysts- no evidence of torsion.  pain had resolved prior to eval, no recurrence of pain.  pt given results and has outpt OB to follow up with.  discussed strict return parameters.  d/w attending

## 2020-06-06 LAB
CULTURE RESULTS: NO GROWTH — SIGNIFICANT CHANGE UP
SPECIMEN SOURCE: SIGNIFICANT CHANGE UP

## 2020-06-21 ENCOUNTER — EMERGENCY (EMERGENCY)
Facility: HOSPITAL | Age: 25
LOS: 1 days | Discharge: HOME | End: 2020-06-21
Attending: EMERGENCY MEDICINE | Admitting: EMERGENCY MEDICINE
Payer: MEDICAID

## 2020-06-21 VITALS
TEMPERATURE: 98 F | DIASTOLIC BLOOD PRESSURE: 71 MMHG | RESPIRATION RATE: 18 BRPM | HEART RATE: 74 BPM | OXYGEN SATURATION: 100 % | SYSTOLIC BLOOD PRESSURE: 106 MMHG

## 2020-06-21 VITALS
SYSTOLIC BLOOD PRESSURE: 122 MMHG | OXYGEN SATURATION: 99 % | DIASTOLIC BLOOD PRESSURE: 76 MMHG | WEIGHT: 171.96 LBS | RESPIRATION RATE: 18 BRPM | TEMPERATURE: 98 F | HEART RATE: 97 BPM

## 2020-06-21 DIAGNOSIS — Z98.891 HISTORY OF UTERINE SCAR FROM PREVIOUS SURGERY: Chronic | ICD-10-CM

## 2020-06-21 LAB
ALBUMIN SERPL ELPH-MCNC: 3.6 G/DL — SIGNIFICANT CHANGE UP (ref 3.3–5)
ALP SERPL-CCNC: 46 U/L — SIGNIFICANT CHANGE UP (ref 40–120)
ALT FLD-CCNC: 8 U/L — LOW (ref 10–45)
ANION GAP SERPL CALC-SCNC: 13 MMOL/L — SIGNIFICANT CHANGE UP (ref 5–17)
APPEARANCE UR: CLEAR — SIGNIFICANT CHANGE UP
APTT BLD: 26.6 SEC — LOW (ref 27.5–36.3)
AST SERPL-CCNC: 14 U/L — SIGNIFICANT CHANGE UP (ref 10–40)
BASOPHILS # BLD AUTO: 0.03 K/UL — SIGNIFICANT CHANGE UP (ref 0–0.2)
BASOPHILS NFR BLD AUTO: 0.3 % — SIGNIFICANT CHANGE UP (ref 0–2)
BILIRUB SERPL-MCNC: 0.2 MG/DL — SIGNIFICANT CHANGE UP (ref 0.2–1.2)
BILIRUB UR-MCNC: NEGATIVE — SIGNIFICANT CHANGE UP
BUN SERPL-MCNC: 4 MG/DL — LOW (ref 7–23)
CALCIUM SERPL-MCNC: 8.8 MG/DL — SIGNIFICANT CHANGE UP (ref 8.4–10.5)
CHLORIDE SERPL-SCNC: 101 MMOL/L — SIGNIFICANT CHANGE UP (ref 96–108)
CO2 SERPL-SCNC: 20 MMOL/L — LOW (ref 22–31)
COLOR SPEC: YELLOW — SIGNIFICANT CHANGE UP
CREAT SERPL-MCNC: 0.57 MG/DL — SIGNIFICANT CHANGE UP (ref 0.5–1.3)
DIFF PNL FLD: NEGATIVE — SIGNIFICANT CHANGE UP
EOSINOPHIL # BLD AUTO: 0.49 K/UL — SIGNIFICANT CHANGE UP (ref 0–0.5)
EOSINOPHIL NFR BLD AUTO: 5.6 % — SIGNIFICANT CHANGE UP (ref 0–6)
GLUCOSE SERPL-MCNC: 139 MG/DL — HIGH (ref 70–99)
GLUCOSE UR QL: NEGATIVE — SIGNIFICANT CHANGE UP
HCG SERPL-ACNC: HIGH MIU/ML
HCT VFR BLD CALC: 31.7 % — LOW (ref 34.5–45)
HGB BLD-MCNC: 10.8 G/DL — LOW (ref 11.5–15.5)
IMM GRANULOCYTES NFR BLD AUTO: 0.2 % — SIGNIFICANT CHANGE UP (ref 0–1.5)
INR BLD: 1.03 — SIGNIFICANT CHANGE UP (ref 0.88–1.16)
KETONES UR-MCNC: NEGATIVE — SIGNIFICANT CHANGE UP
LEUKOCYTE ESTERASE UR-ACNC: ABNORMAL
LYMPHOCYTES # BLD AUTO: 2.43 K/UL — SIGNIFICANT CHANGE UP (ref 1–3.3)
LYMPHOCYTES # BLD AUTO: 27.9 % — SIGNIFICANT CHANGE UP (ref 13–44)
MCHC RBC-ENTMCNC: 31.4 PG — SIGNIFICANT CHANGE UP (ref 27–34)
MCHC RBC-ENTMCNC: 34.1 GM/DL — SIGNIFICANT CHANGE UP (ref 32–36)
MCV RBC AUTO: 92.2 FL — SIGNIFICANT CHANGE UP (ref 80–100)
MONOCYTES # BLD AUTO: 0.51 K/UL — SIGNIFICANT CHANGE UP (ref 0–0.9)
MONOCYTES NFR BLD AUTO: 5.9 % — SIGNIFICANT CHANGE UP (ref 2–14)
NEUTROPHILS # BLD AUTO: 5.22 K/UL — SIGNIFICANT CHANGE UP (ref 1.8–7.4)
NEUTROPHILS NFR BLD AUTO: 60.1 % — SIGNIFICANT CHANGE UP (ref 43–77)
NITRITE UR-MCNC: NEGATIVE — SIGNIFICANT CHANGE UP
NRBC # BLD: 0 /100 WBCS — SIGNIFICANT CHANGE UP (ref 0–0)
PH UR: 7 — SIGNIFICANT CHANGE UP (ref 5–8)
PLATELET # BLD AUTO: 241 K/UL — SIGNIFICANT CHANGE UP (ref 150–400)
POTASSIUM SERPL-MCNC: 3.4 MMOL/L — LOW (ref 3.5–5.3)
POTASSIUM SERPL-SCNC: 3.4 MMOL/L — LOW (ref 3.5–5.3)
PROT SERPL-MCNC: 6.6 G/DL — SIGNIFICANT CHANGE UP (ref 6–8.3)
PROT UR-MCNC: NEGATIVE MG/DL — SIGNIFICANT CHANGE UP
PROTHROM AB SERPL-ACNC: 11.8 SEC — SIGNIFICANT CHANGE UP (ref 10–12.9)
RBC # BLD: 3.44 M/UL — LOW (ref 3.8–5.2)
RBC # FLD: 13.2 % — SIGNIFICANT CHANGE UP (ref 10.3–14.5)
SODIUM SERPL-SCNC: 134 MMOL/L — LOW (ref 135–145)
SP GR SPEC: 1.01 — SIGNIFICANT CHANGE UP (ref 1–1.03)
UROBILINOGEN FLD QL: 0.2 E.U./DL — SIGNIFICANT CHANGE UP
WBC # BLD: 8.7 K/UL — SIGNIFICANT CHANGE UP (ref 3.8–10.5)
WBC # FLD AUTO: 8.7 K/UL — SIGNIFICANT CHANGE UP (ref 3.8–10.5)

## 2020-06-21 PROCEDURE — 85025 COMPLETE CBC W/AUTO DIFF WBC: CPT

## 2020-06-21 PROCEDURE — 86850 RBC ANTIBODY SCREEN: CPT

## 2020-06-21 PROCEDURE — 99284 EMERGENCY DEPT VISIT MOD MDM: CPT | Mod: 25

## 2020-06-21 PROCEDURE — 76805 OB US >/= 14 WKS SNGL FETUS: CPT

## 2020-06-21 PROCEDURE — 80053 COMPREHEN METABOLIC PANEL: CPT

## 2020-06-21 PROCEDURE — 85610 PROTHROMBIN TIME: CPT

## 2020-06-21 PROCEDURE — 74181 MRI ABDOMEN W/O CONTRAST: CPT

## 2020-06-21 PROCEDURE — 84702 CHORIONIC GONADOTROPIN TEST: CPT

## 2020-06-21 PROCEDURE — 36415 COLL VENOUS BLD VENIPUNCTURE: CPT

## 2020-06-21 PROCEDURE — 86901 BLOOD TYPING SEROLOGIC RH(D): CPT

## 2020-06-21 PROCEDURE — 85730 THROMBOPLASTIN TIME PARTIAL: CPT

## 2020-06-21 PROCEDURE — 76805 OB US >/= 14 WKS SNGL FETUS: CPT | Mod: 26

## 2020-06-21 PROCEDURE — 99285 EMERGENCY DEPT VISIT HI MDM: CPT

## 2020-06-21 PROCEDURE — 76817 TRANSVAGINAL US OBSTETRIC: CPT

## 2020-06-21 PROCEDURE — 76817 TRANSVAGINAL US OBSTETRIC: CPT | Mod: 26

## 2020-06-21 PROCEDURE — 74181 MRI ABDOMEN W/O CONTRAST: CPT | Mod: 26

## 2020-06-21 PROCEDURE — 81001 URINALYSIS AUTO W/SCOPE: CPT

## 2020-06-21 RX ORDER — ACETAMINOPHEN 500 MG
1000 TABLET ORAL ONCE
Refills: 0 | Status: COMPLETED | OUTPATIENT
Start: 2020-06-21 | End: 2020-06-21

## 2020-06-21 RX ORDER — ACETAMINOPHEN 500 MG
2 TABLET ORAL
Qty: 30 | Refills: 0
Start: 2020-06-21

## 2020-06-21 RX ORDER — SODIUM CHLORIDE 9 MG/ML
1000 INJECTION INTRAMUSCULAR; INTRAVENOUS; SUBCUTANEOUS
Refills: 0 | Status: COMPLETED | OUTPATIENT
Start: 2020-06-21 | End: 2020-06-21

## 2020-06-21 RX ADMIN — Medication 1000 MILLIGRAM(S): at 16:43

## 2020-06-21 RX ADMIN — SODIUM CHLORIDE 125 MILLILITER(S): 9 INJECTION INTRAMUSCULAR; INTRAVENOUS; SUBCUTANEOUS at 16:43

## 2020-06-21 NOTE — ED ADULT NURSE NOTE - NSIMPLEMENTINTERV_GEN_ALL_ED
Implemented All Universal Safety Interventions:  [place] to call system. Call bell, personal items and telephone within reach. Instruct patient to call for assistance. Room bathroom lighting operational. Non-slip footwear when patient is off stretcher. Physically safe environment: no spills, clutter or unnecessary equipment. Stretcher in lowest position, wheels locked, appropriate side rails in place.

## 2020-06-21 NOTE — ED PROVIDER NOTE - NSFOLLOWUPINSTRUCTIONS_ED_ALL_ED_FT
Take tylenol for pain and please call to make appointment with your OB within 3 days.  Return to ED immediately if pain worsens or you develop fever, vomiting, vaginal bleeding    Abdominal Pain During Pregnancy     Belly (abdominal) pain is common during pregnancy. There are many possible causes. Most of the time, it is not a serious problem. Other times, it can be a sign that something is wrong with the pregnancy. Always tell your doctor if you have belly pain.  Follow these instructions at home:  Do not have sex or put anything in your vagina until your pain goes away completely.Get plenty of rest until your pain gets better.Drink enough fluid to keep your pee (urine) pale yellow.Take over-the-counter and prescription medicines only as told by your doctor.Keep all follow-up visits as told by your doctor. This is important.Contact a doctor if:  Your pain continues or gets worse after resting.You have lower belly pain that:  Comes and goes at regular times.Spreads to your back.Feels like menstrual cramps.You have pain or burning when you pee (urinate).Get help right away if:  You have a fever or chills.You have vaginal bleeding.You are leaking fluid from your vagina.You are passing tissue from your vagina.You throw up (vomit) for more than 24 hours.You have watery poop (diarrhea) for more than 24 hours.Your baby is moving less than usual.You feel very weak or faint.You have shortness of breath.You have very bad pain in your upper belly.Summary  Belly (abdominal) pain is common during pregnancy. There are many possible causes.If you have belly pain during pregnancy, tell your doctor right away.Keep all follow-up visits as told by your doctor. This is important.This information is not intended to replace advice given to you by your health care provider. Make sure you discuss any questions you have with your health care provider.

## 2020-06-21 NOTE — ED PROVIDER NOTE - PATIENT PORTAL LINK FT
You can access the FollowMyHealth Patient Portal offered by [facility] by registering at the following website: [email or web address]. By joining [employer]’s FollowMyHealth portal, you will also be able to view your health information using other applications (apps) compatible with our system.

## 2020-06-21 NOTE — ED PROVIDER NOTE - ATTENDING CONTRIBUTION TO CARE
25 yo female h/o bilat dermoid cyst,  currently 19 wk 2 d c/o pelvic pain intermittently today (none now) similar to pain she had when she was eval  (pelvic u/s w stable dermoid cysts and 16 wk 6 d iup).  No dysuria, abnl bleeding or discharge, flank pain, n/v/d, fever, change in bowel habits.  + fetal movement.  Well appearing, nad, nc/at, lung cta, heart reg, abd soft, gravid, ttp rlq > llq, ext no gross deformity, no gross neuro deficits  ? uti, ? dermoid cyst related pain, ? preg related pain, lower concern for appendicitis given bilat ttp although R > L.  Plan labs, pelvic us, consider mri abd.  Reassess.

## 2020-06-21 NOTE — ED ADULT NURSE NOTE - OBJECTIVE STATEMENT
The pt is a 25 y/o female in her 17th week of pregnancy, came in to ED for evaluation of pelvic pain, denies any urinary symptoms, denies nausea and vomiting.

## 2020-06-21 NOTE — ED PROVIDER NOTE - PROGRESS NOTE DETAILS
Director - labs nl, ua contaminated specimen w epithelial cells and bacteria - no sig concern for uti.  Pt in u/s.  Plan mri abd/pelvis if neg.  Pt signed out to SY Rubi continuing to follow. MRI shows no evidence of appendicitis, noted is likely R ovarian cyst.  sono shows 19 week IUP and b/l small dermoid cysts without evidence of torsion.  pt instructed to take tylenol for pain and follow up with her OB in [place] within 3 days.  discussed strict return parameters.

## 2020-06-21 NOTE — ED PROVIDER NOTE - CLINICAL SUMMARY MEDICAL DECISION MAKING FREE TEXT BOX
25 y/o F, , currently 17w pregnant, presenting with 1 day of lower pelvic pain, intermittent in nature. Pt states similar pain a few weeks back, when she was seen and evaluated in the ER and followed up with her GYN who said everything was normal; no vaginal bleeding/discharge. On exam pt appears well, nontoxic, discomfort upon palpation to the suprapubic region, otherwise soft and nontender. Plan for labs, meds and ultrasound.

## 2020-06-21 NOTE — ED PROVIDER NOTE - PHYSICAL EXAMINATION
General: Patient is well developed and well nourised. Patient is alert and oriented to person, place and date. Patient is laying comfortably in stretcher and appears in no acute distress.  HEENT: Head is normocephalic and atraumatic. Pupils are equal, round and reactive. Extraocular movements intact. No evidence of nystagmus, conjunctival injection, or scleral icterus. External ears symmetric without evidence of discharge.  Nose is symmetric, non-tender, patent without evidence of discharge. Teeth in good repair. Uvula midline.   Neck: Supple with no evidence of lymphadenopathy.  Full range of motion.  Heart: Regular rate and rhythm. No murmurs, rubs or gallops.   Lungs: Clear to auscultation bilaterally with equal chest expansion. No note of wheezes, rhonchi, rales. Equal chest expansion. No note of retractions.  Abdomen: ttp suprapubid region.  Bowel sounds present in all four quadrants. Soft, non-tender, non-distended without signs of masses, rebound or guarding. No note of hepatosplenomegaly. No CVA tenderness bilaterally. Negative Bear sign. No pain present over McBurney's point.  Neuro: GCS 15. Moving all extremities without discomfort. Strength is 5/5 arms and legs bilaterally. Sensation intact in all four extremities. gait steady   Skin: Warm, dry and intact without evidence of rashes, bruising, pallor, jaundice or cyanosis.   Psych: Mood and affect appropriate.

## 2020-06-21 NOTE — ED PROVIDER NOTE - OBJECTIVE STATEMENT
23 y/o F, , currently 17w pregnant, presenting with 1 day of lower pelvic pain. Pt states pain began this morning, waxing and waning in nature, and comes and goes approximately every hour. Denies vaginal bleeding/discharge. Pt states she was seen here a few weeks ago for similar pain and followed up with her gynecologist, who said everything was normal. Pt is otherwise feeling well; denies f/c, chest pain, palpitations, cough, SOB, change in bowel/urinary habits. Pt state she is worried; has not taken any medications for her pain. 25 y/o F, , currently 19w 2d pregnant, presenting with 1 day of lower pelvic pain. Pt states pain began this morning, waxing and waning in nature, and comes and goes approximately every hour. Denies vaginal bleeding/discharge. Pt states she was seen here a few weeks ago for similar pain and followed up with her gynecologist, who said everything was normal. Pt is otherwise feeling well; denies f/c, chest pain, palpitations, cough, SOB, change in bowel/urinary habits. Pt state she is worried; has not taken any medications for her pain.

## 2020-06-25 DIAGNOSIS — R10.2 PELVIC AND PERINEAL PAIN: ICD-10-CM

## 2020-06-25 DIAGNOSIS — Z88.8 ALLERGY STATUS TO OTHER DRUGS, MEDICAMENTS AND BIOLOGICAL SUBSTANCES STATUS: ICD-10-CM

## 2020-06-25 DIAGNOSIS — O26.892 OTHER SPECIFIED PREGNANCY RELATED CONDITIONS, SECOND TRIMESTER: ICD-10-CM

## 2020-06-25 DIAGNOSIS — Z3A.19 19 WEEKS GESTATION OF PREGNANCY: ICD-10-CM

## 2020-12-12 NOTE — ED PROVIDER NOTE - CHPI ED SYMPTOMS NEG
Clearance Letter - No Exposure & Negative Test but Symptomatic    Dear Lauren Hernandez     Return to Work Clearance: 12/12/20    Your COVID-19 test results were negative. You will need to follow your department’s normal unscheduled absence process and/or seek care from your provider if you need to remain off or need further evaluation. Please call the [facility] COVID Team hotline at [phone] if you have further questions.      If you have a new unprotected exposure to someone with COVID-19, please fill out the COVID-19 Exposure Evaluation tool at the top of the COVID-19 Information Center available through your organization’s home page.      As always be vigilant with PPE.      If you are experiencing a life-threatening illness or injury, or a condition you want assessed quickly, you should go to the nearest hospital emergency department and/or dial 9-1-1 for immediate attention. Contact your personal physician by phone or seek walk-in services if you have a matter that requires medical attention and cannot wait for a routinely scheduled appointment.    Leader, please review the Pay Practice document on the COVID-19 Information Center to determine proper coding for time off. The [facility] COVID Team will no longer follow this team member.    Thank you,    [facility] COVID Team    [phone]    Hours: M-F [phone] Saturday . Please answer your phone if an outside line is calling, regardless of hours we work 7 days a week and will follow up as appropriate.     [email or web address]    Cc: Manager   no chills/no fever/no diarrhea, no vaginal bleeding, no vaginal discharge, no urinary sx/no vomiting

## 2021-03-22 NOTE — ED PROVIDER NOTE - OBJECTIVE STATEMENT
25 y/o F PMHx miscarriage 1 M ago presents to the ED with c/o intermittent pelvic pain/ abdominal pain x 1 W especially after eating hot peppers. Pt reports that she was here 1 M ago and was pregnant at that time but has miscarried since then. Pt denies burning urination, hematuria, fevers, chills, sex for the past month. After her visit 1 M ago she never followed up until today. Pt otherwise feels okay. Detail Level: Zone Additional Notes: Pt states she has chapped lips, advised to use lip balm often. Additional Notes: Once labs are back and wnl will send accutane 30mg bid and confirm in ipledge. Render Risk Assessment In Note?: no

## 2022-03-25 ENCOUNTER — EMERGENCY (EMERGENCY)
Facility: HOSPITAL | Age: 27
LOS: 1 days | Discharge: HOME | End: 2022-03-25
Attending: STUDENT IN AN ORGANIZED HEALTH CARE EDUCATION/TRAINING PROGRAM | Admitting: STUDENT IN AN ORGANIZED HEALTH CARE EDUCATION/TRAINING PROGRAM
Payer: MEDICAID

## 2022-03-25 VITALS
RESPIRATION RATE: 18 BRPM | HEART RATE: 78 BPM | DIASTOLIC BLOOD PRESSURE: 68 MMHG | TEMPERATURE: 98 F | OXYGEN SATURATION: 100 % | SYSTOLIC BLOOD PRESSURE: 109 MMHG

## 2022-03-25 VITALS
OXYGEN SATURATION: 100 % | RESPIRATION RATE: 18 BRPM | WEIGHT: 179.02 LBS | DIASTOLIC BLOOD PRESSURE: 84 MMHG | HEIGHT: 67 IN | SYSTOLIC BLOOD PRESSURE: 131 MMHG | TEMPERATURE: 98 F | HEART RATE: 80 BPM

## 2022-03-25 DIAGNOSIS — Z98.891 HISTORY OF UTERINE SCAR FROM PREVIOUS SURGERY: Chronic | ICD-10-CM

## 2022-03-25 LAB
ANION GAP SERPL CALC-SCNC: 11 MMOL/L — SIGNIFICANT CHANGE UP (ref 5–17)
BASOPHILS # BLD AUTO: 0.09 K/UL — SIGNIFICANT CHANGE UP (ref 0–0.2)
BASOPHILS NFR BLD AUTO: 1.1 % — SIGNIFICANT CHANGE UP (ref 0–2)
BUN SERPL-MCNC: 11 MG/DL — SIGNIFICANT CHANGE UP (ref 7–23)
CALCIUM SERPL-MCNC: 9.7 MG/DL — SIGNIFICANT CHANGE UP (ref 8.4–10.5)
CHLORIDE SERPL-SCNC: 105 MMOL/L — SIGNIFICANT CHANGE UP (ref 96–108)
CO2 SERPL-SCNC: 24 MMOL/L — SIGNIFICANT CHANGE UP (ref 22–31)
CREAT SERPL-MCNC: 0.8 MG/DL — SIGNIFICANT CHANGE UP (ref 0.5–1.3)
EGFR: 105 ML/MIN/1.73M2 — SIGNIFICANT CHANGE UP
EOSINOPHIL # BLD AUTO: 0.45 K/UL — SIGNIFICANT CHANGE UP (ref 0–0.5)
EOSINOPHIL NFR BLD AUTO: 5.6 % — SIGNIFICANT CHANGE UP (ref 0–6)
GLUCOSE SERPL-MCNC: 110 MG/DL — HIGH (ref 70–99)
HCT VFR BLD CALC: 37.9 % — SIGNIFICANT CHANGE UP (ref 34.5–45)
HGB BLD-MCNC: 12.3 G/DL — SIGNIFICANT CHANGE UP (ref 11.5–15.5)
IMM GRANULOCYTES NFR BLD AUTO: 0.3 % — SIGNIFICANT CHANGE UP (ref 0–1.5)
LYMPHOCYTES # BLD AUTO: 4.01 K/UL — HIGH (ref 1–3.3)
LYMPHOCYTES # BLD AUTO: 50.3 % — HIGH (ref 13–44)
MCHC RBC-ENTMCNC: 30.5 PG — SIGNIFICANT CHANGE UP (ref 27–34)
MCHC RBC-ENTMCNC: 32.5 GM/DL — SIGNIFICANT CHANGE UP (ref 32–36)
MCV RBC AUTO: 94 FL — SIGNIFICANT CHANGE UP (ref 80–100)
MONOCYTES # BLD AUTO: 0.8 K/UL — SIGNIFICANT CHANGE UP (ref 0–0.9)
MONOCYTES NFR BLD AUTO: 10 % — SIGNIFICANT CHANGE UP (ref 2–14)
NEUTROPHILS # BLD AUTO: 2.61 K/UL — SIGNIFICANT CHANGE UP (ref 1.8–7.4)
NEUTROPHILS NFR BLD AUTO: 32.7 % — LOW (ref 43–77)
NRBC # BLD: 0 /100 WBCS — SIGNIFICANT CHANGE UP (ref 0–0)
PLATELET # BLD AUTO: 334 K/UL — SIGNIFICANT CHANGE UP (ref 150–400)
POTASSIUM SERPL-MCNC: SIGNIFICANT CHANGE UP (ref 3.5–5.3)
POTASSIUM SERPL-SCNC: SIGNIFICANT CHANGE UP (ref 3.5–5.3)
RBC # BLD: 4.03 M/UL — SIGNIFICANT CHANGE UP (ref 3.8–5.2)
RBC # FLD: 13.2 % — SIGNIFICANT CHANGE UP (ref 10.3–14.5)
SODIUM SERPL-SCNC: 140 MMOL/L — SIGNIFICANT CHANGE UP (ref 135–145)
TROPONIN T SERPL-MCNC: <0.01 NG/ML — SIGNIFICANT CHANGE UP (ref 0–0.01)
WBC # BLD: 7.98 K/UL — SIGNIFICANT CHANGE UP (ref 3.8–10.5)
WBC # FLD AUTO: 7.98 K/UL — SIGNIFICANT CHANGE UP (ref 3.8–10.5)

## 2022-03-25 PROCEDURE — 99284 EMERGENCY DEPT VISIT MOD MDM: CPT

## 2022-03-25 PROCEDURE — 84484 ASSAY OF TROPONIN QUANT: CPT

## 2022-03-25 PROCEDURE — 80048 BASIC METABOLIC PNL TOTAL CA: CPT

## 2022-03-25 PROCEDURE — 99285 EMERGENCY DEPT VISIT HI MDM: CPT | Mod: 25

## 2022-03-25 PROCEDURE — 36415 COLL VENOUS BLD VENIPUNCTURE: CPT

## 2022-03-25 PROCEDURE — 71046 X-RAY EXAM CHEST 2 VIEWS: CPT

## 2022-03-25 PROCEDURE — 71046 X-RAY EXAM CHEST 2 VIEWS: CPT | Mod: 26

## 2022-03-25 PROCEDURE — 93005 ELECTROCARDIOGRAM TRACING: CPT

## 2022-03-25 PROCEDURE — 85025 COMPLETE CBC W/AUTO DIFF WBC: CPT

## 2022-03-25 RX ORDER — ASPIRIN/CALCIUM CARB/MAGNESIUM 324 MG
162 TABLET ORAL ONCE
Refills: 0 | Status: COMPLETED | OUTPATIENT
Start: 2022-03-25 | End: 2022-03-25

## 2022-03-25 RX ADMIN — Medication 162 MILLIGRAM(S): at 18:54

## 2022-03-25 NOTE — ED PROVIDER NOTE - PROGRESS NOTE DETAILS
Labs including trop unremarkable.  CXR without cause for pain.  Plan to discharge home with return precautions and outpatient followup.

## 2022-03-25 NOTE — ED PROVIDER NOTE - PATIENT PORTAL LINK FT
You can access the FollowMyHealth Patient Portal offered by [facility] by registering at the following website: [email or web address]. By joining [employer]’s FollowMyHealth portal, you will also be able to view your health information using other applications (apps) compatible with our system.

## 2022-03-25 NOTE — ED PROVIDER NOTE - PHYSICAL EXAMINATION
General: comfortable, resting in ED  HEENT: atraumatic, no eye erythema or discharge  Pulm: no cyanosis, no added work of breathing  Cardiac: extremities warm, intact peripheral pulse  GI: no abdominal distension, abdomen nontender  Neuro: alert, conversant  Psych: neutral affect, cooperative  Msk: no gross deformity or instability, no chest wall tenderness  Breast (with RN Kanchan): bilaterally: no erythema, no nipple discharge, no fluctuance, no masses, no asymmetry, no dimpling  Skin: no erythema or rash

## 2022-03-25 NOTE — ED PROVIDER NOTE - NSFOLLOWUPCLINICS_GEN_ALL_ED_FT
[facility]  Family Medicine  [address]  [place], [place] [address]  Phone: [phone]  Fax:

## 2022-03-25 NOTE — ED PROVIDER NOTE - CLINICAL SUMMARY MEDICAL DECISION MAKING FREE TEXT BOX
Concern for musculoskeletal chest pain given intermittent non-exertional nature.  EKG with unexpected ischemic changes in inferior leads, will need to r/o ACS despite age and lack of risk factors.  Additionally r/o anemia with subsequent myocardial ischemia as cause for pain.  R/o PNA and pneumothorax as etiology of pain.  No tachycardia or shortness of breath to suggest PE as cause of pain.

## 2022-03-25 NOTE — ED PROVIDER NOTE - NSTIMEPROVIDERCAREINITIATE_GEN_ER
25-Mar-2022 18:26

## 2022-03-25 NOTE — ED PROVIDER NOTE - OBJECTIVE STATEMENT
Pt requested resources for Seniors and caregivers. LSW provided resources to pt.    26F with no past medical history now with 3 days of intermittent bilateral non-exertional chest pain and breast pain lasting 5 minutes per episode.  No family history of sudden cardiac death or heart attack.

## 2022-03-25 NOTE — ED ADULT NURSE NOTE - OBJECTIVE STATEMENT
26y female presents to ED c/o chest pain x2 weeks, constantly present but waxing and waning in intensity. Endorses headache as well. Denies sob, n/v/d. Denies pmh, daily meds. A&Ox4.

## 2022-03-28 DIAGNOSIS — R07.89 OTHER CHEST PAIN: ICD-10-CM

## 2022-03-28 DIAGNOSIS — Z91.040 LATEX ALLERGY STATUS: ICD-10-CM

## 2022-05-30 NOTE — ED PROVIDER NOTE - PROGRESS NOTE DETAILS
Pharmacokinetic Assessment Follow Up: IV Vancomycin    Vancomycin serum concentration assessment(s):    The trough level was drawn correctly and can be used to guide therapy at this time. The measurement is below the desired definitive target range of 15 to 20 mcg/mL.    Pt is low weight with scr bump on admit- pt was conservatively dosed with 1250 mg , trough < 5 on 5/30. Dose increased to 1750 mg q 24 hrs.    Vancomycin Regimen Plan:    Change regimen to Vancomycin 1750 mg IV every 24 hours with next serum trough concentration measured at 1800 prior to 3rd dose on 06/01    Drug levels (last 3 results):  Recent Labs   Lab Result Units 05/30/22  1641   Vancomycin, Random ug/mL 3.5       Pharmacy will continue to follow and monitor vancomycin.    Please contact pharmacy at extension [phone] for questions regarding this assessment.    Thank you for the consult,   MEET ARAUJO       Patient brief summary:  Марина Britton is a 34 y.o. female initiated on antimicrobial therapy with IV Vancomycin for treatment of lower respiratory infection    The patient's current regimen is Vancomycin 1750 mg q 24 hrs    Drug Allergies:   Review of patient's allergies indicates:   Allergen Reactions    Soap Rash     [facility] - Hospital supplied - cleanser shampoo & body wash gel  Ingredients - purified water, sodium laureth sulfate, sodium chloride, cocamide william, cocamidopropylamine oxide, fragrance, aloe barbadensis leaf juice, propylene alcohol, peg-150 distearate, diazolidinyl urea, ciric acid, methylparaben, & propulparaben       Actual Body Weight:   60.3 kg     Renal Function:   Estimated Creatinine Clearance: 68.5 mL/min (based on SCr of 1 mg/dL).,     Dialysis Method (if applicable):  N/A    CBC (last 72 hours):  Recent Labs   Lab Result Units 05/29/22  1200 05/30/22  0354   WBC K/uL 5.06 12.30   Hemoglobin g/dL 14.5 10.7*   Hematocrit % 45.4 31.3*   Platelets K/uL 347 217   Gran % % 38.0 59.0   Lymph % % 32.0  4.0*   Mono % % 0.0* 4.0   Eosinophil % % 1.0 0.0   Basophil % % 0.0 0.0   Differential Method  Manual Manual       Metabolic Panel (last 72 hours):  Recent Labs   Lab Result Units 05/29/22  1200 05/29/22  1230 05/30/22  0354   Sodium mmol/L 144  --  141   Potassium mmol/L 3.7  --  4.0   Chloride mmol/L 110  --  109   CO2 mmol/L 24  --  24   Glucose mg/dL 25*  --  91   Glucose, UA   --  3+*  --    BUN mg/dL 18  --  14   Creatinine mg/dL 1.7*  --  1.0   Creatinine, Urine mg/dL  --  139.0  --    Albumin g/dL 3.6  --  2.7*   Total Bilirubin mg/dL 0.4  --  1.0   Alkaline Phosphatase U/L 105  --  40*   AST U/L 315*  --  113*   ALT U/L 158*  --  111*       Vancomycin Administrations:  vancomycin given in the last 96 hours                     vancomycin 1.25 g in dextrose 5% 250 mL IVPB (ready to mix) (mg) 1,250 mg New Bag 05/29/22 1705                    Microbiologic Results:  Microbiology Results (last 7 days)       Procedure Component Value Units Date/Time    Blood culture x two cultures. Draw prior to antibiotics. [[id number]] Collected: 05/29/22 1200    Order Status: Completed Specimen: Blood from Antecubital, Left Updated: 05/30/22 0315     Blood Culture, Routine No Growth to date    Narrative:      Aerobic and anaerobic    Blood culture x two cultures. Draw prior to antibiotics. [[id number]] Collected: 05/29/22 1526    Order Status: Completed Specimen: Blood Updated: 05/30/22 0315     Blood Culture, Routine No Growth to date    Narrative:      Aerobic and anaerobic    Urine culture [[id number]] Collected: 05/29/22 1230    Order Status: No result Specimen: Urine Updated: 05/29/22 1258           Gyn paged Pt seen and examined by Gyn. Bedside US performed. Missed ab. Pt can RTC in 1 week for repeat HCG testing Pt seen and examined by Gyn. Bedside US performed by gyn: Cervix dilated, with clot in cervix, nothing visualized in the  uterus. In the setting of prior IUD, unknown preg PTA, prior US findings and sig decreasing HCG, findings most c/w missed ab. Pt can RTC in 1 week for repeat HCG testing

## 2022-09-26 NOTE — ED ADULT NURSE NOTE - NSFALLRSKASSESASSIST_ED_ALL_ED
Arterial Line  Performed by: Bobby Nesbitt M.D.  Authorized by: Bobby Nesbitt M.D.     Start Time:  9/26/2022 7:59 AM  End Time:  9/26/2022 8:02 AM  Localization: surface landmarks    Patient Location:  OR  Indication: continuous blood pressure monitoring        Catheter Size:  20 G  Seldinger Technique?: Yes    Laterality:  Right  Site:  Radial artery  Line Secured:  Antimicrobial disc, tape and transparent dressing  Events: patient tolerated procedure well with no complications           no

## 2023-02-06 ENCOUNTER — EMERGENCY (EMERGENCY)
Facility: HOSPITAL | Age: 28
LOS: 1 days | Discharge: HOME | End: 2023-02-06
Attending: EMERGENCY MEDICINE | Admitting: EMERGENCY MEDICINE
Payer: COMMERCIAL

## 2023-02-06 VITALS
HEART RATE: 74 BPM | DIASTOLIC BLOOD PRESSURE: 78 MMHG | OXYGEN SATURATION: 98 % | SYSTOLIC BLOOD PRESSURE: 124 MMHG | TEMPERATURE: 99 F | RESPIRATION RATE: 16 BRPM

## 2023-02-06 VITALS
SYSTOLIC BLOOD PRESSURE: 117 MMHG | RESPIRATION RATE: 16 BRPM | WEIGHT: 182.1 LBS | OXYGEN SATURATION: 100 % | TEMPERATURE: 98 F | DIASTOLIC BLOOD PRESSURE: 75 MMHG | HEART RATE: 76 BPM

## 2023-02-06 DIAGNOSIS — Z98.891 HISTORY OF UTERINE SCAR FROM PREVIOUS SURGERY: Chronic | ICD-10-CM

## 2023-02-06 DIAGNOSIS — M54.9 DORSALGIA, UNSPECIFIED: ICD-10-CM

## 2023-02-06 DIAGNOSIS — Z91.040 LATEX ALLERGY STATUS: ICD-10-CM

## 2023-02-06 DIAGNOSIS — R10.31 RIGHT LOWER QUADRANT PAIN: ICD-10-CM

## 2023-02-06 DIAGNOSIS — Z87.42 PERSONAL HISTORY OF OTHER DISEASES OF THE FEMALE GENITAL TRACT: ICD-10-CM

## 2023-02-06 DIAGNOSIS — R31.9 HEMATURIA, UNSPECIFIED: ICD-10-CM

## 2023-02-06 LAB
APPEARANCE UR: CLEAR — SIGNIFICANT CHANGE UP
BACTERIA # UR AUTO: PRESENT /HPF
BILIRUB UR-MCNC: NEGATIVE — SIGNIFICANT CHANGE UP
COLOR SPEC: YELLOW — SIGNIFICANT CHANGE UP
COMMENT - URINE: SIGNIFICANT CHANGE UP
DIFF PNL FLD: ABNORMAL
EPI CELLS # UR: ABNORMAL /HPF (ref 0–5)
GLUCOSE UR QL: NEGATIVE — SIGNIFICANT CHANGE UP
KETONES UR-MCNC: NEGATIVE — SIGNIFICANT CHANGE UP
LEUKOCYTE ESTERASE UR-ACNC: ABNORMAL
NITRITE UR-MCNC: NEGATIVE — SIGNIFICANT CHANGE UP
PH UR: 6.5 — SIGNIFICANT CHANGE UP (ref 5–8)
PROT UR-MCNC: NEGATIVE MG/DL — SIGNIFICANT CHANGE UP
RBC CASTS # UR COMP ASSIST: < 5 /HPF — SIGNIFICANT CHANGE UP
SP GR SPEC: <=1.005 — SIGNIFICANT CHANGE UP (ref 1–1.03)
UROBILINOGEN FLD QL: 0.2 E.U./DL — SIGNIFICANT CHANGE UP
WBC UR QL: ABNORMAL /HPF

## 2023-02-06 PROCEDURE — 87184 SC STD DISK METHOD PER PLATE: CPT

## 2023-02-06 PROCEDURE — 87086 URINE CULTURE/COLONY COUNT: CPT

## 2023-02-06 PROCEDURE — 99284 EMERGENCY DEPT VISIT MOD MDM: CPT | Mod: 25

## 2023-02-06 PROCEDURE — 99284 EMERGENCY DEPT VISIT MOD MDM: CPT

## 2023-02-06 PROCEDURE — 74176 CT ABD & PELVIS W/O CONTRAST: CPT | Mod: 26,QQ

## 2023-02-06 PROCEDURE — 81001 URINALYSIS AUTO W/SCOPE: CPT

## 2023-02-06 PROCEDURE — 74176 CT ABD & PELVIS W/O CONTRAST: CPT | Mod: QQ

## 2023-02-06 NOTE — ED PROVIDER NOTE - PHYSICAL EXAMINATION
Constitutional: Well appearing, awake, alert, oriented to person, place, time/situation and in no apparent distress.  ENMT: Airway patent. Normal MM  Eyes: Clear bilaterally  Cardiac: Normal rate, regular rhythm.   Respiratory: No increased WOB, tachypnea, hypoxia, or accessory mm use. Pt speaks in full sentences.   Gastrointestinal: Abd soft, NT, ND, NABS. No guarding, rebound, or rigidity. No pulsatile abdominal masses. No organomegaly appreciated. No CVAT   Musculoskeletal: Range of motion is not limited. no midline or paraspinal ttp  Neuro: Alert and oriented x 3, face symmetric and speech fluent. Strength 5/5 x 4 ext and symmetric, nml gross motor movement, nml gait. No focal deficits noted.  Skin: Skin normal color for race, warm, dry and intact. No evidence of rash.  Psych: Alert and oriented to person, place, time/situation. normal mood and affect. no apparent risk to self or others.

## 2023-02-06 NOTE — ED ADULT TRIAGE NOTE - HEART RATE (BEATS/MIN)
ACM received call from Luis Fernando Oquendo who stated she was able to get patient an appointment with PCP for this week. She will need evaluation for home health aide documented. Per Louise Cervantes patient's son will be bringing her to the appointment. 77

## 2023-02-06 NOTE — ED ADULT NURSE NOTE - CHPI ED NUR SYMPTOMS POS
CHIEF COMPLAINT: Prediabetes  64 y.o. old being seen as a new patient. States concerned about risk of diabetes. States that he does exercise but unable to lose fat around abdomen. Rides bike for an hour 3-4 times week. He also does some stair climbing. Weight stable past year. Eats heavier on vegetable side. Limited meat intake but more fish. He is a physician- urgent care. States that fat tissue around abdomen is not increasing but no improvement with exercise. No recent steroids. No easy bruising.       PAST MEDICAL HISTORY/PAST SURGICAL HISTORY:  Reviewed in [employer]    SOCIAL HISTORY: Reviewed in [facility]    FAMILY HISTORY:  Father with DM 2. No known thyroid disease    MEDICATIONS/ALLERGIES: The patient's MedCard has been updated and reviewed.          PE:    GENERAL: Well developed, well nourished.  NECK: Supple, trachea midline, no palpable thyroid nodules  CHEST: Resp even and unlabored, CTA bilateral.  CARDIAC: RRR, S1, S2 heard, no murmurs, rubs, S3, or S4  SKIN: no acanthosis nigracans.    LABS/Radiology    ASSESSMENT/PLAN:  1. Pre diabetes- concerned about weight gain.  Especially concern with fat deposition around upper abdomen.  He is a physician.  He does try to watch his diet as well as appears to be exercising.  Mainly doing cardio.  Diet is predominantly vegetable based.  Reviewed diet as well as exercise.  Discussed incorporating resistance training exercise routine.  Will do a 24 hour urine cortisol.  However, Cushing's is less likely on the differential.      FOLLOWUP  24 hr urine cortisol  Fasting CMP, Lipids, TSH  
PAIN

## 2023-02-06 NOTE — ED PROVIDER NOTE - PATIENT PORTAL LINK FT
You can access the FollowMyHealth Patient Portal offered by [facility] by registering at the following website: [email or web address]. By joining [email or web address]’s FollowMyHealth portal, you will also be able to view your health information using other applications (apps) compatible with our system.

## 2023-02-06 NOTE — ED PROVIDER NOTE - PROGRESS NOTE DETAILS
D/w pt results : 1. No nephrolithiasis or hydronephrosis. No other acute or suspicious   finding, s/p ovarian cystectomy. NO recurrent pain in the ED, UA neg for infection. F/u PCP, supportive care

## 2023-02-06 NOTE — ED PROVIDER NOTE - NS ED ROS FT
Constitutional: No fever or chills.   GI: No nausea, vomiting, diarrhea or abdominal pain.  : No dysuria, frequency or burning.  MS: See HPI  Neuro: No headache or weakness. No LOC.  Skin: No skin rash.   Endocrine: No history of thyroid disease or diabetes.  Except as documented in the HPI, all other systems are negative.

## 2023-02-06 NOTE — ED PROVIDER NOTE - CLINICAL SUMMARY MEDICAL DECISION MAKING FREE TEXT BOX
Pt p/w intermittent back pain, not present on exam. No ttp. No hx ureteral colic. No UTI sx. UA w/ trace blood, not on menses. D/w pt consideration of ureteral colic, although not uncomfortable at this time. D/w pt may be transient micro hematuria, less likely ureteral colic vs MSK pain. Benign exam. Pt requesting imaging. CT neg for ureteral colic. S/p ovarian cystectomy on R w/o new findings. Remains pain-free. F/u PCP, supportive care, return precautions

## 2023-02-06 NOTE — ED PROVIDER NOTE - OBJECTIVE STATEMENT
Pt w/ no sig PMHx p/w R sided back pain, intermittently x 1 weeks. The pain is crampy, initially reported as non radiating, but then states sometimes she feels pain in RLQ. She denies pain on exam. She has not taken anything for pain. NO n/v/d/c. No F/U/D hematuria. No hx renal colic. No vag bleeding / discharge. Denies hx ovarian cyst

## 2023-02-06 NOTE — ED PROVIDER NOTE - NSFOLLOWUPINSTRUCTIONS_ED_ALL_ED_FT
You were evaluated in the ED for right sided back pain without other symptoms. You had no pain or tenderness on exam. Your urinalysis was negative infection. You had a CT of the abdomen and pelvis which did not show any kidney stones, nor other findings to identify your pain. Take Motrin and alternate with Tylenol for pain. Use over-the-counter topical medications, such as Lidocaine patches, Icy Hot, or Dakota Heaton. Follow up with your regular medial doctor.      Back Pain    WHAT YOU NEED TO KNOW:    Back pain is common. You may have back pain and muscle spasms. You may feel sore or stiff on one or both sides of your back. The pain may spread to your lower body. Conditions that affect the spine, joints, or muscles can cause back pain. These may include arthritis, spinal stenosis (narrowing of the spinal column), muscle tension, or breakdown of the spinal discs.    DISCHARGE INSTRUCTIONS:    Call your local emergency number (911 in the [place]) if:   •You have severe back pain with chest pain.      •You cannot control your urine or bowel movements.      •Your pain becomes so severe that you cannot walk.      Return to the emergency department if:   •You have pain, numbness, or weakness in one or both legs.      •You have severe back pain, nausea, and vomiting.      •You have severe back pain that spreads to your side or genital area.      Call your doctor if:   •You have back pain that does not get better with rest and pain medicine.      •You have a fever.      •You have pain that worsens when you are on your back or when you rest.      •You have pain that worsens when you cough or sneeze.      •You lose weight without trying.      •You have questions or concerns about your condition or care.      Medicines: You may need any of the following:   •NSAIDs help decrease swelling and pain or fever. This medicine is available with or without a doctor's order. NSAIDs can cause stomach bleeding or kidney problems in certain people. If you take blood thinner medicine, always ask your healthcare provider if NSAIDs are safe for you. Always read the medicine label and follow directions.      •Acetaminophen decreases pain and fever. It is available without a doctor's order. Ask how much to take and how often to take it. Follow directions. Read the labels of all other medicines you are using to see if they also contain acetaminophen, or ask your doctor or pharmacist. Acetaminophen can cause liver damage if not taken correctly.      •Muscle relaxers help decrease muscle spasms and back pain.      •Prescription pain medicine may be given. Ask your healthcare provider how to take this medicine safely. Some prescription pain medicines contain acetaminophen. Do not take other medicines that contain acetaminophen without talking to your healthcare provider. Too much acetaminophen may cause liver damage. Prescription pain medicine may cause constipation. Ask your healthcare provider how to prevent or treat constipation.       •Take your medicine as directed. Contact your healthcare provider if you think your medicine is not helping or if you have side effects. Tell your provider if you are allergic to any medicine. Keep a list of the medicines, vitamins, and herbs you take. Include the amounts, and when and why you take them. Bring the list or the pill bottles to follow-up visits. Carry your medicine list with you in case of an emergency.      How to manage your back pain:   •Apply ice on your back for 15 to 20 minutes every hour or as directed. Use an ice pack, or put crushed ice in a plastic bag. Cover it with a towel before you apply it to your skin. Ice helps prevent tissue damage and decreases pain.      •Apply heat on your back for 20 to 30 minutes every 2 hours for as many days as directed. Heat helps decrease pain and muscle spasms.      •Stay active as much as you can without causing more pain. Bed rest could make your back pain worse. Avoid heavy lifting until your pain is gone.  [employer] Walking for Exercise           •Go to physical therapy as directed. A physical therapist can teach you exercises to help improve movement and strength, and to decrease pain.      Follow up with your doctor in 2 weeks, or as directed: You might need to see a specialist. Write down your questions so you remember to ask them during your visits.

## 2023-02-06 NOTE — ED ADULT NURSE REASSESSMENT NOTE - NS ED NURSE REASSESS COMMENT FT1
All labs, CT scan resulted, no new symptom complaint. Vital signs stable.  [facility]

## 2023-02-06 NOTE — ED ADULT NURSE NOTE - CHPI ED NUR SEVERITY2
Virtual Check-in Visit    Reason for visit is productive cough, worse in the morning  This virtual check-in was done via telephone  Encounter provider Saul Chatman MD    Provider located at [address] [id number]      Recent Visits  No visits were found meeting these conditions  Showing recent visits within past 7 days and meeting all other requirements     Future Appointments  No visits were found meeting these conditions  Showing future appointments within next 150 days and meeting all other requirements        Patient agrees to participate in a virtual check in via telephone or video visit instead of presenting to the office to address urgent/immediate medical needs  Patient is aware this is a billable service  After connecting through telephone, the patient was identified by name and date of birth  Linette Nuno was informed that this was a telemedicine visit and that the exam was being conducted confidentially over secure lines  My office door was closed  Other methods to assure confidentiality were taken no door, however in in the back of the office  No one else was in the room  He acknowledged consent and understanding of privacy and security of the virtual check-in visit  I informed the patient that I have reviewed his record in Epic and presented the opportunity for him to ask any questions regarding the visit today  The patient initiated communication and agreed to participate  Subjective  Linette Nuno is a 71 y o  male with PMHx of Polio, bronchiectasis has tele visit for cough with productive sputum  Sx's are worse in the morning  He was previously given Breo 100 daily, however it has now run out  He felt that it did slightly help symptoms  He states that cough and sputum prouduction has worsened over the past several months  He has no sick contacts   He denies fever, chills, malaise, or myalgias  He is concerned about coming in contact with [id number] and appears to be socially distancing at this point  Past Medical History:   Diagnosis Date    3-vessel CAD     CAP (community acquired pneumonia)     Last assessed - 3/15/16    Cardiac murmur     Disease of thyroid gland     History of poliomyelitis     Hyperlipidemia     Hypertension     Methicillin resistant Staphylococcus aureus infection     Mitral valve insufficiency     Nonspecific abnormal results of function study of thyroid     Last assessed - 10/15/14       Past Surgical History:   Procedure Laterality Date    CARDIAC SURGERY  1997    CORONARY ARTERY BYPASS GRAFT      x3; Last assessed - 9/29/17    WI COLONOSCOPY FLX DX W/COLLJ SPEC WHEN PFRMD N/A 3/27/2019    Procedure: COLONOSCOPY;  Surgeon: Sabina Chaney MD;  Location: [facility];   Service: Gastroenterology       Current Outpatient Medications   Medication Sig Dispense Refill    aspirin (ECOTRIN) 325 mg EC tablet Take 1 tablet by mouth daily      BASAGLAR KWIKPEN 100 units/mL injection pen INJECT 20 UNITS UNDER THE SKIN DAILY 1 pen 1    diazepam (VALIUM) 5 mg tablet Take 1 tablet (5 mg total) by mouth every 8 (eight) hours as needed for anxiety (anxiety) (Patient not taking: Reported on 10/18/2019) 90 tablet 1    diltiazem (CARDIZEM CD) 240 mg 24 hr capsule TAKE ONE CAPSULE BY MOUTH EVERY DAY 90 capsule 3    fexofenadine (ALLEGRA) 180 MG tablet TAKE 1 TABLET BY MOUTH EVERY DAY AS NEEDED FOR ALLERGIES 10 tablet 0    glucose blood (AIMEE CONTOUR TEST) test strip by In Vitro route daily      hydrocortisone (ANUSOL-HC) 25 mg suppository Insert 1 suppository (25 mg total) into the rectum 2 (two) times a day as needed for hemorrhoids (Patient not taking: Reported on 7/25/2019) 12 suppository 1    hydrocortisone (ANUSOL-HC, PROCTOSOL HC,) 2 5 % rectal cream Insert into the rectum 2 (two) times a day 30 g 5    insulin glargine (BASAGLAR KWIKPEN) 100 units/mL injection pen Inject 20 Units under the skin daily 5 pen 2    Insulin Pen Needle (B-D UF III MINI PEN NEEDLES) 31G X 5 MM MISC by Does not apply route      levothyroxine 50 mcg tablet TAKE 1 TABLET BY MOUTH DAILY 90 tablet 1    losartan (COZAAR) 50 mg tablet TAKE 1 TABLET BY MOUTH EVERY DAY 90 tablet 3    metFORMIN (GLUCOPHAGE-XR) 500 mg 24 hr tablet TAKE 1 TABLET BY MOUTH TWICE A  tablet 3    Omega-3 Fatty Acids (FISH OIL) 1,000 mg Take 1 capsule by mouth daily      sildenafil (REVATIO) 20 mg tablet TAKE 1 TABLET BY MOUTH EVERY DAY AS NEEDED 30 tablet 3    simvastatin (ZOCOR) 40 mg tablet Take 1 tablet by mouth      VITAMIN B COMPLEX-C PO Take 1 tablet by mouth daily       No current facility-administered medications for this visit  Allergies   Allergen Reactions    Atorvastatin      Other reaction(s): Itching    Levofloxacin      Other reaction(s): GI Upset    Pravastatin      Other reaction(s): Itching       Assessment    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   Disposition:    Polio osteopathy is stable, he is aware the any changes in sx's are unlikely to be due to this  Rx CT chest for worsening sx's of cough with white sputum potentially due to worsening bronchiectasis  Rx Breo 100 qd  Rx nebulizer  Rx albuterol nebs qAM + qPM  I encouraged his attempts to socailly distance, particularly due to his existing comobidities    F/u in 3 months after CT Chest        TeleConsultation -  Pulmonary Medicine  Con Patient 71 y o  male MRN: [id number]  Unit/Bed#:  Encounter: [id number]          Dasha Lopez MD    I have personally spent 5 minutes reviewing the chart and imaging prior to the visit  I have personally spent 15 minutes on the phone with the patient  I have personally spent 5 minutes reviewing imaging, laboratory results and other testing results with the patient        Total visit time: 25 minutes PAIN SCALE 4 OF 10.

## 2023-02-06 NOTE — ED ADULT NURSE NOTE - OBJECTIVE STATEMENT
Patient c/o of constant sharp aching bilateral flank pain, no nausea/vomititng, no dysuria or urinary symptoms, mild lower abdominal discomfort, no fever/chills.

## 2023-02-08 LAB
-  CLINDAMYCIN: SIGNIFICANT CHANGE UP
-  LEVOFLOXACIN: SIGNIFICANT CHANGE UP
CULTURE RESULTS: SIGNIFICANT CHANGE UP
METHOD TYPE: SIGNIFICANT CHANGE UP
ORGANISM # SPEC MICROSCOPIC CNT: SIGNIFICANT CHANGE UP
ORGANISM # SPEC MICROSCOPIC CNT: SIGNIFICANT CHANGE UP
SPECIMEN SOURCE: SIGNIFICANT CHANGE UP

## 2023-02-28 NOTE — ED PROVIDER NOTE - CCCP TRG CHIEF CMPLNT
Harman Branch attended Intensive Cardiac Rehab today from 1000 to 1200. During his time he exercised and attended a one-on-one session with the Registered Dietitian.  Patient received information specific to his dietary needs and preferences.   
chest pain

## 2023-03-21 ENCOUNTER — EMERGENCY (EMERGENCY)
Facility: HOSPITAL | Age: 28
LOS: 1 days | Discharge: HOME | End: 2023-03-21
Attending: EMERGENCY MEDICINE | Admitting: EMERGENCY MEDICINE
Payer: MEDICAID

## 2023-03-21 VITALS
HEIGHT: 67 IN | RESPIRATION RATE: 18 BRPM | HEART RATE: 91 BPM | SYSTOLIC BLOOD PRESSURE: 111 MMHG | DIASTOLIC BLOOD PRESSURE: 74 MMHG | TEMPERATURE: 99 F | OXYGEN SATURATION: 99 % | WEIGHT: 182.1 LBS

## 2023-03-21 DIAGNOSIS — Z98.891 HISTORY OF UTERINE SCAR FROM PREVIOUS SURGERY: Chronic | ICD-10-CM

## 2023-03-21 LAB
ALBUMIN SERPL ELPH-MCNC: 3.9 G/DL — SIGNIFICANT CHANGE UP (ref 3.3–5)
ALP SERPL-CCNC: 57 U/L — SIGNIFICANT CHANGE UP (ref 40–120)
ALT FLD-CCNC: 13 U/L — SIGNIFICANT CHANGE UP (ref 10–45)
ANION GAP SERPL CALC-SCNC: 10 MMOL/L — SIGNIFICANT CHANGE UP (ref 5–17)
APPEARANCE UR: CLEAR — SIGNIFICANT CHANGE UP
AST SERPL-CCNC: 16 U/L — SIGNIFICANT CHANGE UP (ref 10–40)
BACTERIA # UR AUTO: PRESENT /HPF
BASOPHILS # BLD AUTO: 0.05 K/UL — SIGNIFICANT CHANGE UP (ref 0–0.2)
BASOPHILS NFR BLD AUTO: 0.7 % — SIGNIFICANT CHANGE UP (ref 0–2)
BILIRUB SERPL-MCNC: 0.4 MG/DL — SIGNIFICANT CHANGE UP (ref 0.2–1.2)
BILIRUB UR-MCNC: NEGATIVE — SIGNIFICANT CHANGE UP
BUN SERPL-MCNC: 9 MG/DL — SIGNIFICANT CHANGE UP (ref 7–23)
CALCIUM SERPL-MCNC: 9 MG/DL — SIGNIFICANT CHANGE UP (ref 8.4–10.5)
CHLORIDE SERPL-SCNC: 105 MMOL/L — SIGNIFICANT CHANGE UP (ref 96–108)
CO2 SERPL-SCNC: 24 MMOL/L — SIGNIFICANT CHANGE UP (ref 22–31)
COLOR SPEC: YELLOW — SIGNIFICANT CHANGE UP
COMMENT - URINE: SIGNIFICANT CHANGE UP
CREAT SERPL-MCNC: 0.78 MG/DL — SIGNIFICANT CHANGE UP (ref 0.5–1.3)
DIFF PNL FLD: NEGATIVE — SIGNIFICANT CHANGE UP
EGFR: 107 ML/MIN/1.73M2 — SIGNIFICANT CHANGE UP
EOSINOPHIL # BLD AUTO: 0.2 K/UL — SIGNIFICANT CHANGE UP (ref 0–0.5)
EOSINOPHIL NFR BLD AUTO: 2.6 % — SIGNIFICANT CHANGE UP (ref 0–6)
EPI CELLS # UR: ABNORMAL /HPF (ref 0–5)
GLUCOSE SERPL-MCNC: 103 MG/DL — HIGH (ref 70–99)
GLUCOSE UR QL: NEGATIVE — SIGNIFICANT CHANGE UP
HCG SERPL-ACNC: <0 MIU/ML — SIGNIFICANT CHANGE UP
HCT VFR BLD CALC: 35.9 % — SIGNIFICANT CHANGE UP (ref 34.5–45)
HGB BLD-MCNC: 12 G/DL — SIGNIFICANT CHANGE UP (ref 11.5–15.5)
IMM GRANULOCYTES NFR BLD AUTO: 0.1 % — SIGNIFICANT CHANGE UP (ref 0–0.9)
KETONES UR-MCNC: NEGATIVE — SIGNIFICANT CHANGE UP
LEUKOCYTE ESTERASE UR-ACNC: ABNORMAL
LYMPHOCYTES # BLD AUTO: 3.81 K/UL — HIGH (ref 1–3.3)
LYMPHOCYTES # BLD AUTO: 50.5 % — HIGH (ref 13–44)
MCHC RBC-ENTMCNC: 31.7 PG — SIGNIFICANT CHANGE UP (ref 27–34)
MCHC RBC-ENTMCNC: 33.4 GM/DL — SIGNIFICANT CHANGE UP (ref 32–36)
MCV RBC AUTO: 94.7 FL — SIGNIFICANT CHANGE UP (ref 80–100)
MONOCYTES # BLD AUTO: 0.67 K/UL — SIGNIFICANT CHANGE UP (ref 0–0.9)
MONOCYTES NFR BLD AUTO: 8.9 % — SIGNIFICANT CHANGE UP (ref 2–14)
NEUTROPHILS # BLD AUTO: 2.81 K/UL — SIGNIFICANT CHANGE UP (ref 1.8–7.4)
NEUTROPHILS NFR BLD AUTO: 37.2 % — LOW (ref 43–77)
NITRITE UR-MCNC: NEGATIVE — SIGNIFICANT CHANGE UP
NRBC # BLD: 0 /100 WBCS — SIGNIFICANT CHANGE UP (ref 0–0)
PH UR: 6 — SIGNIFICANT CHANGE UP (ref 5–8)
PLATELET # BLD AUTO: 282 K/UL — SIGNIFICANT CHANGE UP (ref 150–400)
POTASSIUM SERPL-MCNC: 4.1 MMOL/L — SIGNIFICANT CHANGE UP (ref 3.5–5.3)
POTASSIUM SERPL-SCNC: 4.1 MMOL/L — SIGNIFICANT CHANGE UP (ref 3.5–5.3)
PROT SERPL-MCNC: 7.3 G/DL — SIGNIFICANT CHANGE UP (ref 6–8.3)
PROT UR-MCNC: NEGATIVE MG/DL — SIGNIFICANT CHANGE UP
RBC # BLD: 3.79 M/UL — LOW (ref 3.8–5.2)
RBC # FLD: 13 % — SIGNIFICANT CHANGE UP (ref 10.3–14.5)
RBC CASTS # UR COMP ASSIST: < 5 /HPF — SIGNIFICANT CHANGE UP
SODIUM SERPL-SCNC: 139 MMOL/L — SIGNIFICANT CHANGE UP (ref 135–145)
SP GR SPEC: 1.02 — SIGNIFICANT CHANGE UP (ref 1–1.03)
UROBILINOGEN FLD QL: 0.2 E.U./DL — SIGNIFICANT CHANGE UP
WBC # BLD: 7.55 K/UL — SIGNIFICANT CHANGE UP (ref 3.8–10.5)
WBC # FLD AUTO: 7.55 K/UL — SIGNIFICANT CHANGE UP (ref 3.8–10.5)
WBC UR QL: ABNORMAL /HPF

## 2023-03-21 PROCEDURE — 81001 URINALYSIS AUTO W/SCOPE: CPT

## 2023-03-21 PROCEDURE — 36415 COLL VENOUS BLD VENIPUNCTURE: CPT

## 2023-03-21 PROCEDURE — 84702 CHORIONIC GONADOTROPIN TEST: CPT

## 2023-03-21 PROCEDURE — 85025 COMPLETE CBC W/AUTO DIFF WBC: CPT

## 2023-03-21 PROCEDURE — 87086 URINE CULTURE/COLONY COUNT: CPT

## 2023-03-21 PROCEDURE — 99284 EMERGENCY DEPT VISIT MOD MDM: CPT

## 2023-03-21 PROCEDURE — 99284 EMERGENCY DEPT VISIT MOD MDM: CPT | Mod: 25

## 2023-03-21 PROCEDURE — 80053 COMPREHEN METABOLIC PANEL: CPT

## 2023-03-21 PROCEDURE — 96374 THER/PROPH/DIAG INJ IV PUSH: CPT

## 2023-03-21 RX ORDER — SODIUM CHLORIDE 9 MG/ML
1000 INJECTION INTRAMUSCULAR; INTRAVENOUS; SUBCUTANEOUS ONCE
Refills: 0 | Status: COMPLETED | OUTPATIENT
Start: 2023-03-21 | End: 2023-03-21

## 2023-03-21 RX ORDER — CEPHALEXIN 500 MG
500 CAPSULE ORAL ONCE
Refills: 0 | Status: COMPLETED | OUTPATIENT
Start: 2023-03-21 | End: 2023-03-21

## 2023-03-21 RX ORDER — CEPHALEXIN 500 MG
1 CAPSULE ORAL
Qty: 10 | Refills: 0
Start: 2023-03-21 | End: 2023-03-25

## 2023-03-21 RX ORDER — KETOROLAC TROMETHAMINE 30 MG/ML
15 SYRINGE (ML) INJECTION ONCE
Refills: 0 | Status: DISCONTINUED | OUTPATIENT
Start: 2023-03-21 | End: 2023-03-21

## 2023-03-21 RX ADMIN — Medication 500 MILLIGRAM(S): at 19:17

## 2023-03-21 RX ADMIN — SODIUM CHLORIDE 1000 MILLILITER(S): 9 INJECTION INTRAMUSCULAR; INTRAVENOUS; SUBCUTANEOUS at 18:06

## 2023-03-21 RX ADMIN — Medication 15 MILLIGRAM(S): at 18:06

## 2023-03-21 NOTE — ED ADULT NURSE NOTE - OBJECTIVE STATEMENT
27y F, presents to the ED c/o lower abdominal pain since yesterday. Last BM yesterday. Denies chest pain, fever, chills, NVD. Pt A&Ox4, ambulatory with steady gait, speaking in clear/full sentences, vital signs stable on room air.

## 2023-03-21 NOTE — ED PROVIDER NOTE - PROGRESS NOTE DETAILS
Klepfish: Labs grossly wnl. hcg neg. UA w/ moderate leuk esterase and 5-10 WBCs. Given mild dysuria, will treat for UTI. Feeling much better. abd soft NTND.

## 2023-03-21 NOTE — ED ADULT NURSE NOTE - NSHOSCREENINGQ1_ED_ALL_ED
PT Name: Desirae No  MR #: [id number]     Physician Query Form - Documentation Clarification      CDS/: Tri Rivera               Contact information: [email or web address]    This form is a permanent document in the medical record.     Query Date: October 19, 2018    By submitting this query, we are merely seeking further clarification of documentation. Please utilize your independent clinical judgment when addressing the question(s) below.    The Medical record reflects the following:    Supporting Clinical Findings Location in Medical Record   Presents with known tender abscess and infection involving her right axilla     General Surgery PN 10/17   Axillary abscess    Incision and drainage with debridement of skin and subcutaneous tissue.  Purulent material was drained   Op Note 10/16   Methicillin resistant staphylococcus aureus  Moderate     Aerobic Culture, Abscess, Axilla 10/16                                                                            Doctor, Please specify diagnosis or diagnoses associated with above clinical findings.    Provider Use Only      [ c ] Infection due to Methicillin resistant Staphylococcus aureus    [  ] Infection due to other organism, please further specify:  ____________________    [  ] Other, please specify:  ______________________                                                                                                             [  ] Clinically undetermined             No

## 2023-03-21 NOTE — ED PROVIDER NOTE - CLINICAL SUMMARY MEDICAL DECISION MAKING FREE TEXT BOX
27F no PMH, PSHx , cystectomy p/w abd pain. Pt has b/l flank pain, radiating to suprapubic region for at least over 6 weeks. Pain is intermittent. Pt states she was in ED 23 for similar pain. CT a/p non-con showed "...Unchanged left ovarian mature teratoma. Right ovarian mature teratoma interval cystectomy... IMPRESSION: 1. No nephrolithiasis or hydronephrosis. No other acute or suspicious finding." Pt returns to Ed today bec of persistent recurring symptoms. Also intermittent nausea. Also mild dysuria. No other systemic symptoms.   Vitals wnl, exam as above.  ddx: unclear etiology. clinically no acute intra-abd pathology.   Labs, UA, reassess.

## 2023-03-21 NOTE — ED PROVIDER NOTE - NSFOLLOWUPINSTRUCTIONS_ED_ALL_ED_FT
Can take tylenol 650mg every 6hrs as needed for pain.    Can also take motrin 600mg every 6hrs as needed for pain (WARNING: Use may cause stomach issues/problems. Take with food. Prolonged use can also cause kidney issues.).     Take antibiotics as prescribed for possible UTI.    Stay well hydrated.    Return for fevers, persistent vomit, uncontrolled pain, worsening breathing, worsening lightheaded.    Follow up with primary doctor within 1-2 days.     Follow up with urologist for persistent symptoms. Can call [phone] to schedule appointment. Can also follow up at [facility] urology clinic. Can call [phone] to schedule appointment.     Follow up with your OBGYN regarding the ovarian cyst that was seen on the CT scan in February.  Can also call [facility] at [phone].  OR can follow up at [facility]: [phone].  [address]     Urinary Tract Infection    A urinary tract infection (UTI) is an infection of any part of the urinary tract, which includes the kidneys, ureters, bladder, and urethra. Risk factors include ignoring your need to urinate, wiping back to front if female, being an uncircumcised male, and having diabetes or a weak immune system. Symptoms include frequent urination, pain or burning with urination, foul smelling urine, cloudy urine, pain in the lower abdomen, blood in the urine, and fever. If you were prescribed an antibiotic medicine, take it as told by your health care provider. Do not stop taking the antibiotic even if you start to feel better.    SEEK IMMEDIATE MEDICAL CARE IF YOU HAVE ANY OF THE FOLLOWING SYMPTOMS: severe back or abdominal pain, fever, inability to keep fluids or medicine down, dizziness/lightheadedness, or a change in mental status.

## 2023-03-21 NOTE — ED PROVIDER NOTE - OBJECTIVE STATEMENT
27F no PMH, PSHx , cystectomy p/w abd pain. Pt has b/l flank pain, radiating to suprapubic region for at least over 6 weeks. Pain is intermittent. Pt states she was in ED 23 for similar pain. CT a/p non-con showed "...Unchanged left ovarian mature teratoma. Right ovarian mature teratoma interval cystectomy... IMPRESSION: 1. No nephrolithiasis or hydronephrosis. No other acute or suspicious finding." Pt returns to Ed today bec of persistent recurring symptoms. Also intermittent nausea. Also mild dysuria. No other systemic symptoms.   Denies fevers, chills, vomiting, diarrhea, black stool, bloody stool, hematuria, urinary frequency, focal weakness, focal numbness, lightheadedness, SOB, CP, rhinorrhea, nasal congestion, sore throat.

## 2023-03-21 NOTE — ED ADULT TRIAGE NOTE - CHIEF COMPLAINT QUOTE
Pt reports lower abd pain that radiates to lower abd since yesterday. pt denies any N/V/D/Fever. last BM yesterday

## 2023-03-21 NOTE — ED PROVIDER NOTE - PATIENT PORTAL LINK FT
You can access the FollowMyHealth Patient Portal offered by [facility] by registering at the following website: [email or web address]. By joining [employer]’s FollowMyHealth portal, you will also be able to view your health information using other applications (apps) compatible with our system.

## 2023-03-24 DIAGNOSIS — R11.0 NAUSEA: ICD-10-CM

## 2023-03-24 DIAGNOSIS — N39.0 URINARY TRACT INFECTION, SITE NOT SPECIFIED: ICD-10-CM

## 2023-03-24 DIAGNOSIS — R10.9 UNSPECIFIED ABDOMINAL PAIN: ICD-10-CM

## 2023-03-24 DIAGNOSIS — Z91.040 LATEX ALLERGY STATUS: ICD-10-CM

## 2023-03-24 DIAGNOSIS — R82.998 OTHER ABNORMAL FINDINGS IN URINE: ICD-10-CM

## 2023-03-24 DIAGNOSIS — Z87.42 PERSONAL HISTORY OF OTHER DISEASES OF THE FEMALE GENITAL TRACT: ICD-10-CM

## 2023-03-24 LAB
CULTURE RESULTS: SIGNIFICANT CHANGE UP
SPECIMEN SOURCE: SIGNIFICANT CHANGE UP

## 2023-08-16 NOTE — ED PROVIDER NOTE - NS ED ACP SCRIBE NAME FT
Bernabe Poon (Nell) Cryotherapy Text: The wound bed was treated with cryotherapy after the biopsy was performed.

## 2023-09-05 NOTE — ED ADULT TRIAGE NOTE - NS ED TRIAGE AVPU SCALE
[Mother] : mother Alert-The patient is alert, awake and responds to voice. The patient is oriented to time, place, and person. The triage nurse is able to obtain subjective information.

## 2023-09-07 ENCOUNTER — EMERGENCY (EMERGENCY)
Facility: HOSPITAL | Age: 28
LOS: 1 days | Discharge: HOME | End: 2023-09-07
Attending: EMERGENCY MEDICINE | Admitting: EMERGENCY MEDICINE
Payer: COMMERCIAL

## 2023-09-07 VITALS
SYSTOLIC BLOOD PRESSURE: 119 MMHG | OXYGEN SATURATION: 99 % | DIASTOLIC BLOOD PRESSURE: 79 MMHG | WEIGHT: 186.07 LBS | TEMPERATURE: 98 F | HEART RATE: 79 BPM | RESPIRATION RATE: 17 BRPM

## 2023-09-07 VITALS
TEMPERATURE: 98 F | OXYGEN SATURATION: 100 % | DIASTOLIC BLOOD PRESSURE: 84 MMHG | RESPIRATION RATE: 16 BRPM | HEART RATE: 84 BPM | SYSTOLIC BLOOD PRESSURE: 129 MMHG

## 2023-09-07 DIAGNOSIS — Z91.040 LATEX ALLERGY STATUS: ICD-10-CM

## 2023-09-07 DIAGNOSIS — Z98.891 HISTORY OF UTERINE SCAR FROM PREVIOUS SURGERY: Chronic | ICD-10-CM

## 2023-09-07 DIAGNOSIS — Z98.891 HISTORY OF UTERINE SCAR FROM PREVIOUS SURGERY: ICD-10-CM

## 2023-09-07 DIAGNOSIS — R10.9 UNSPECIFIED ABDOMINAL PAIN: ICD-10-CM

## 2023-09-07 DIAGNOSIS — N12 TUBULO-INTERSTITIAL NEPHRITIS, NOT SPECIFIED AS ACUTE OR CHRONIC: ICD-10-CM

## 2023-09-07 LAB
ALBUMIN SERPL ELPH-MCNC: 4.2 G/DL — SIGNIFICANT CHANGE UP (ref 3.3–5)
ALP SERPL-CCNC: 61 U/L — SIGNIFICANT CHANGE UP (ref 40–120)
ALT FLD-CCNC: 13 U/L — SIGNIFICANT CHANGE UP (ref 10–45)
ANION GAP SERPL CALC-SCNC: 8 MMOL/L — SIGNIFICANT CHANGE UP (ref 5–17)
APPEARANCE UR: CLEAR — SIGNIFICANT CHANGE UP
AST SERPL-CCNC: 17 U/L — SIGNIFICANT CHANGE UP (ref 10–40)
BACTERIA # UR AUTO: SIGNIFICANT CHANGE UP /HPF
BASOPHILS # BLD AUTO: 0.05 K/UL — SIGNIFICANT CHANGE UP (ref 0–0.2)
BASOPHILS NFR BLD AUTO: 0.9 % — SIGNIFICANT CHANGE UP (ref 0–2)
BILIRUB SERPL-MCNC: 0.6 MG/DL — SIGNIFICANT CHANGE UP (ref 0.2–1.2)
BILIRUB UR-MCNC: NEGATIVE — SIGNIFICANT CHANGE UP
BUN SERPL-MCNC: 9 MG/DL — SIGNIFICANT CHANGE UP (ref 7–23)
CALCIUM SERPL-MCNC: 9.4 MG/DL — SIGNIFICANT CHANGE UP (ref 8.4–10.5)
CHLORIDE SERPL-SCNC: 106 MMOL/L — SIGNIFICANT CHANGE UP (ref 96–108)
CO2 SERPL-SCNC: 25 MMOL/L — SIGNIFICANT CHANGE UP (ref 22–31)
COLOR SPEC: YELLOW — SIGNIFICANT CHANGE UP
COMMENT - URINE: SIGNIFICANT CHANGE UP
CREAT SERPL-MCNC: 0.65 MG/DL — SIGNIFICANT CHANGE UP (ref 0.5–1.3)
DIFF PNL FLD: NEGATIVE — SIGNIFICANT CHANGE UP
EGFR: 124 ML/MIN/1.73M2 — SIGNIFICANT CHANGE UP
ELLIPTOCYTES BLD QL SMEAR: SLIGHT — SIGNIFICANT CHANGE UP
EOSINOPHIL # BLD AUTO: 0.27 K/UL — SIGNIFICANT CHANGE UP (ref 0–0.5)
EOSINOPHIL NFR BLD AUTO: 4.4 % — SIGNIFICANT CHANGE UP (ref 0–6)
EPI CELLS # UR: SIGNIFICANT CHANGE UP /HPF (ref 0–5)
GLUCOSE SERPL-MCNC: 95 MG/DL — SIGNIFICANT CHANGE UP (ref 70–99)
GLUCOSE UR QL: NEGATIVE — SIGNIFICANT CHANGE UP
HCT VFR BLD CALC: 34.7 % — SIGNIFICANT CHANGE UP (ref 34.5–45)
HGB BLD-MCNC: 11.5 G/DL — SIGNIFICANT CHANGE UP (ref 11.5–15.5)
KETONES UR-MCNC: NEGATIVE — SIGNIFICANT CHANGE UP
LEUKOCYTE ESTERASE UR-ACNC: ABNORMAL
LIDOCAIN IGE QN: 16 U/L — SIGNIFICANT CHANGE UP (ref 7–60)
LYMPHOCYTES # BLD AUTO: 3.23 K/UL — SIGNIFICANT CHANGE UP (ref 1–3.3)
LYMPHOCYTES # BLD AUTO: 53.5 % — HIGH (ref 13–44)
MANUAL SMEAR VERIFICATION: SIGNIFICANT CHANGE UP
MCHC RBC-ENTMCNC: 31.5 PG — SIGNIFICANT CHANGE UP (ref 27–34)
MCHC RBC-ENTMCNC: 33.1 GM/DL — SIGNIFICANT CHANGE UP (ref 32–36)
MCV RBC AUTO: 95.1 FL — SIGNIFICANT CHANGE UP (ref 80–100)
MONOCYTES # BLD AUTO: 0.37 K/UL — SIGNIFICANT CHANGE UP (ref 0–0.9)
MONOCYTES NFR BLD AUTO: 6.1 % — SIGNIFICANT CHANGE UP (ref 2–14)
NEUTROPHILS # BLD AUTO: 2.12 K/UL — SIGNIFICANT CHANGE UP (ref 1.8–7.4)
NEUTROPHILS NFR BLD AUTO: 35.1 % — LOW (ref 43–77)
NITRITE UR-MCNC: NEGATIVE — SIGNIFICANT CHANGE UP
OVALOCYTES BLD QL SMEAR: SLIGHT — SIGNIFICANT CHANGE UP
PH UR: 6 — SIGNIFICANT CHANGE UP (ref 5–8)
PLAT MORPH BLD: NORMAL — SIGNIFICANT CHANGE UP
PLATELET # BLD AUTO: 279 K/UL — SIGNIFICANT CHANGE UP (ref 150–400)
POIKILOCYTOSIS BLD QL AUTO: SIGNIFICANT CHANGE UP
POTASSIUM SERPL-MCNC: 4.4 MMOL/L — SIGNIFICANT CHANGE UP (ref 3.5–5.3)
POTASSIUM SERPL-SCNC: 4.4 MMOL/L — SIGNIFICANT CHANGE UP (ref 3.5–5.3)
PROT SERPL-MCNC: 7.4 G/DL — SIGNIFICANT CHANGE UP (ref 6–8.3)
PROT UR-MCNC: NEGATIVE MG/DL — SIGNIFICANT CHANGE UP
RBC # BLD: 3.65 M/UL — LOW (ref 3.8–5.2)
RBC # FLD: 13 % — SIGNIFICANT CHANGE UP (ref 10.3–14.5)
RBC BLD AUTO: ABNORMAL
RBC CASTS # UR COMP ASSIST: < 5 /HPF — SIGNIFICANT CHANGE UP
SODIUM SERPL-SCNC: 139 MMOL/L — SIGNIFICANT CHANGE UP (ref 135–145)
SP GR SPEC: 1.02 — SIGNIFICANT CHANGE UP (ref 1–1.03)
UROBILINOGEN FLD QL: 0.2 E.U./DL — SIGNIFICANT CHANGE UP
WBC # BLD: 6.04 K/UL — SIGNIFICANT CHANGE UP (ref 3.8–10.5)
WBC # FLD AUTO: 6.04 K/UL — SIGNIFICANT CHANGE UP (ref 3.8–10.5)
WBC UR QL: < 5 /HPF — SIGNIFICANT CHANGE UP

## 2023-09-07 PROCEDURE — 96374 THER/PROPH/DIAG INJ IV PUSH: CPT

## 2023-09-07 PROCEDURE — 85025 COMPLETE CBC W/AUTO DIFF WBC: CPT

## 2023-09-07 PROCEDURE — 83690 ASSAY OF LIPASE: CPT

## 2023-09-07 PROCEDURE — 99284 EMERGENCY DEPT VISIT MOD MDM: CPT | Mod: 25

## 2023-09-07 PROCEDURE — 80053 COMPREHEN METABOLIC PANEL: CPT

## 2023-09-07 PROCEDURE — 36415 COLL VENOUS BLD VENIPUNCTURE: CPT

## 2023-09-07 PROCEDURE — 81001 URINALYSIS AUTO W/SCOPE: CPT

## 2023-09-07 PROCEDURE — 99284 EMERGENCY DEPT VISIT MOD MDM: CPT

## 2023-09-07 PROCEDURE — 96361 HYDRATE IV INFUSION ADD-ON: CPT

## 2023-09-07 RX ORDER — SODIUM CHLORIDE 9 MG/ML
1000 INJECTION INTRAMUSCULAR; INTRAVENOUS; SUBCUTANEOUS ONCE
Refills: 0 | Status: COMPLETED | OUTPATIENT
Start: 2023-09-07 | End: 2023-09-07

## 2023-09-07 RX ORDER — CEFPODOXIME PROXETIL 100 MG
1 TABLET ORAL
Qty: 20 | Refills: 0
Start: 2023-09-07 | End: 2023-09-16

## 2023-09-07 RX ORDER — KETOROLAC TROMETHAMINE 30 MG/ML
15 SYRINGE (ML) INJECTION ONCE
Refills: 0 | Status: DISCONTINUED | OUTPATIENT
Start: 2023-09-07 | End: 2023-09-07

## 2023-09-07 RX ORDER — CEFPODOXIME PROXETIL 100 MG
200 TABLET ORAL ONCE
Refills: 0 | Status: COMPLETED | OUTPATIENT
Start: 2023-09-07 | End: 2023-09-07

## 2023-09-07 RX ADMIN — Medication 15 MILLIGRAM(S): at 11:52

## 2023-09-07 RX ADMIN — SODIUM CHLORIDE 1000 MILLILITER(S): 9 INJECTION INTRAMUSCULAR; INTRAVENOUS; SUBCUTANEOUS at 11:22

## 2023-09-07 RX ADMIN — Medication 15 MILLIGRAM(S): at 11:22

## 2023-09-07 RX ADMIN — Medication 200 MILLIGRAM(S): at 12:44

## 2023-09-07 RX ADMIN — SODIUM CHLORIDE 1000 MILLILITER(S): 9 INJECTION INTRAMUSCULAR; INTRAVENOUS; SUBCUTANEOUS at 12:35

## 2023-09-07 NOTE — ED PROVIDER NOTE - NS ED ATTENDING STATEMENT MOD
This was a shared visit with the [place]. I reviewed and verified the documentation and independently performed the documented:

## 2023-09-07 NOTE — ED PROVIDER NOTE - PHYSICAL EXAMINATION
VITAL SIGNS: I have reviewed nursing notes and confirm.  CONSTITUTIONAL: Well-developed; in no acute distress.   SKIN:  warm and dry, no acute rash.   HEAD:  normocephalic, atraumatic.  EYES: PERRL, EOM intact; conjunctiva and sclera clear.  ENT: No nasal discharge; airway clear.   NECK: Supple; non tender.  CARD: S1, S2 normal; no murmurs, gallops, or rubs. Regular rate and rhythm.   RESP:  Clear to auscultation b/l, no wheezes, rales or rhonchi.  ABD: Normal bowel sounds; soft; non-distended; non-tender; no guarding/ rebound. +CVA tenderness bilaterally.  EXT: Normal ROM. No clubbing, cyanosis or edema. 2+ pulses to b/l ue/le.  NEURO: Alert, oriented, grossly unremarkable  PSYCH: Cooperative, mood and affect appropriate.

## 2023-09-07 NOTE — ED PROVIDER NOTE - CLINICAL SUMMARY MEDICAL DECISION MAKING FREE TEXT BOX
Pt is afebrile and hemodynamically stable. She has bilateral CVA tenderness. Labs unremarkable. She has leuk esterase on UA, urine culture pending. Will treat for pyelonephritis as pt c/o 2wk flank pain, dysuria and urgency. Started on cefpodoxime. Return precautions given.

## 2023-09-07 NOTE — ED PROVIDER NOTE - ATTENDING APP SHARED VISIT CONTRIBUTION OF CARE
28 y/o female with bilateral flank pain, dysuria and urinary frequency. Will treat clinically pending urine culture for pyelonephritis. Pt hemodynamically stable and can be treated as an outpt.

## 2023-09-07 NOTE — ED PROVIDER NOTE - PATIENT PORTAL LINK FT
You can access the FollowMyHealth Patient Portal offered by [facility] by registering at the following website: [email or web address]. By joining [employer]’s FollowMyHealth portal, you will also be able to view your health information using other applications (apps) compatible with our system.

## 2023-09-07 NOTE — ED ADULT NURSE NOTE - OBJECTIVE STATEMENT
28 y/o F who denies pmhx presents to the ED c/o bilateral oblique pain x4 days. Pt is a poor historian and minimally cooperative in exam. Pain does not radiate. Nothing makes pain better or worse. Denies taking medication for pain. Pain is rated a 3/10 in severity. Pt asking to eat multiple times in exam. Denies fever, dysuria, N/V/D, and any other complaints at this time. On exam, A&Ox4, NAD, ambulatory on RA. Abdomen soft, NTTP. No CVA TTP. VSS.

## 2023-09-07 NOTE — ED PROVIDER NOTE - NS ED ROS FT
Constitutional: No fever. No chills.  Eyes: No redness. No discharge. No vision change.   ENT: No sore throat. No ear pain.  Cardiovascular: No chest pain. No leg swelling.  Respiratory: No cough. No shortness of breath.  GI: No abdominal pain. No vomiting. No diarrhea.   : +flank pain.+dysuria. No hematuria.   MSK: No joint pain. No back pain.   Skin: No rash. No abrasions.   Neuro: No numbness. No weakness.   Psych: No known mental health issues.

## 2023-09-07 NOTE — ED PROVIDER NOTE - NSFOLLOWUPINSTRUCTIONS_ED_ALL_ED_FT
Take one tab of cefpodoxime twice daily for 10 days.    Take ibuprofen 600mg up to three times daily for pain.    Drink plenty of clear fluids.    Please follow up for reassessment with your primary care doctor.    Return to the Emergency Department if you develop fever>100.4F, worsening pain, vomiting or any other concerns.

## 2023-12-14 NOTE — ED PROVIDER NOTE - GENITOURINARY VULVA
well developed, well nourished , in no acute distress , ambulating without difficulty , normal communication ability normal

## 2024-03-05 NOTE — ED ADULT NURSE NOTE - CCCP TRG CHIEF CMPLNT
beta follow up
Assistance with ambulation/Assistance OOB with selected safe patient handling equipment/Communicate Risk of Fall with Harm to all staff/Monitor gait and stability/Reinforce activity limits and safety measures with patient and family/Sit up slowly, dangle for a short time, stand at bedside before walking/Tailored Fall Risk Interventions/Use of alarms - bed, chair and/or voice tab/Visual Cue: Yellow wristband and red socks/Bed in lowest position, wheels locked, appropriate side rails in place/Call bell, personal items and telephone in reach/Instruct patient to call for assistance before getting out of bed or chair/Non-slip footwear when patient is out of bed/[place] to call system/Physically safe environment - no spills, clutter or unnecessary equipment/Purposeful Proactive Rounding/Room/bathroom lighting operational, light cord in reach

## 2024-04-23 ENCOUNTER — EMERGENCY (EMERGENCY)
Facility: HOSPITAL | Age: 29
LOS: 1 days | Discharge: HOME | End: 2024-04-23
Attending: STUDENT IN AN ORGANIZED HEALTH CARE EDUCATION/TRAINING PROGRAM | Admitting: STUDENT IN AN ORGANIZED HEALTH CARE EDUCATION/TRAINING PROGRAM
Payer: MEDICAID

## 2024-04-23 VITALS
HEART RATE: 76 BPM | RESPIRATION RATE: 17 BRPM | TEMPERATURE: 98 F | SYSTOLIC BLOOD PRESSURE: 109 MMHG | DIASTOLIC BLOOD PRESSURE: 74 MMHG | OXYGEN SATURATION: 99 %

## 2024-04-23 VITALS
SYSTOLIC BLOOD PRESSURE: 134 MMHG | OXYGEN SATURATION: 99 % | TEMPERATURE: 99 F | HEART RATE: 88 BPM | HEIGHT: 66 IN | DIASTOLIC BLOOD PRESSURE: 81 MMHG | WEIGHT: 195.11 LBS | RESPIRATION RATE: 18 BRPM

## 2024-04-23 DIAGNOSIS — Z98.891 HISTORY OF UTERINE SCAR FROM PREVIOUS SURGERY: Chronic | ICD-10-CM

## 2024-04-23 LAB
ALBUMIN SERPL ELPH-MCNC: 4.4 G/DL — SIGNIFICANT CHANGE UP (ref 3.3–5)
ALP SERPL-CCNC: 70 U/L — SIGNIFICANT CHANGE UP (ref 40–120)
ALT FLD-CCNC: 14 U/L — SIGNIFICANT CHANGE UP (ref 10–45)
ANION GAP SERPL CALC-SCNC: 14 MMOL/L — SIGNIFICANT CHANGE UP (ref 5–17)
APPEARANCE UR: CLEAR — SIGNIFICANT CHANGE UP
AST SERPL-CCNC: 22 U/L — SIGNIFICANT CHANGE UP (ref 10–40)
BACTERIA # UR AUTO: NEGATIVE /HPF — SIGNIFICANT CHANGE UP
BASOPHILS # BLD AUTO: 0.05 K/UL — SIGNIFICANT CHANGE UP (ref 0–0.2)
BASOPHILS NFR BLD AUTO: 0.6 % — SIGNIFICANT CHANGE UP (ref 0–2)
BILIRUB SERPL-MCNC: 0.3 MG/DL — SIGNIFICANT CHANGE UP (ref 0.2–1.2)
BILIRUB UR-MCNC: NEGATIVE — SIGNIFICANT CHANGE UP
BUN SERPL-MCNC: 10 MG/DL — SIGNIFICANT CHANGE UP (ref 7–23)
CALCIUM SERPL-MCNC: 9.6 MG/DL — SIGNIFICANT CHANGE UP (ref 8.4–10.5)
CHLORIDE SERPL-SCNC: 104 MMOL/L — SIGNIFICANT CHANGE UP (ref 96–108)
CO2 SERPL-SCNC: 25 MMOL/L — SIGNIFICANT CHANGE UP (ref 22–31)
COLOR SPEC: YELLOW — SIGNIFICANT CHANGE UP
CREAT SERPL-MCNC: 0.69 MG/DL — SIGNIFICANT CHANGE UP (ref 0.5–1.3)
DIFF PNL FLD: NEGATIVE — SIGNIFICANT CHANGE UP
EGFR: 122 ML/MIN/1.73M2 — SIGNIFICANT CHANGE UP
EOSINOPHIL # BLD AUTO: 0.4 K/UL — SIGNIFICANT CHANGE UP (ref 0–0.5)
EOSINOPHIL NFR BLD AUTO: 4.9 % — SIGNIFICANT CHANGE UP (ref 0–6)
GLUCOSE SERPL-MCNC: 168 MG/DL — HIGH (ref 70–99)
GLUCOSE UR QL: NEGATIVE MG/DL — SIGNIFICANT CHANGE UP
HCG SERPL-ACNC: <1 MIU/ML — SIGNIFICANT CHANGE UP
HCT VFR BLD CALC: 38.8 % — SIGNIFICANT CHANGE UP (ref 34.5–45)
HGB BLD-MCNC: 12.7 G/DL — SIGNIFICANT CHANGE UP (ref 11.5–15.5)
IMM GRANULOCYTES NFR BLD AUTO: 0.4 % — SIGNIFICANT CHANGE UP (ref 0–0.9)
KETONES UR-MCNC: NEGATIVE MG/DL — SIGNIFICANT CHANGE UP
LEUKOCYTE ESTERASE UR-ACNC: ABNORMAL
LIDOCAIN IGE QN: 29 U/L — SIGNIFICANT CHANGE UP (ref 7–60)
LYMPHOCYTES # BLD AUTO: 3.64 K/UL — HIGH (ref 1–3.3)
LYMPHOCYTES # BLD AUTO: 44.8 % — HIGH (ref 13–44)
MCHC RBC-ENTMCNC: 31 PG — SIGNIFICANT CHANGE UP (ref 27–34)
MCHC RBC-ENTMCNC: 32.7 GM/DL — SIGNIFICANT CHANGE UP (ref 32–36)
MCV RBC AUTO: 94.6 FL — SIGNIFICANT CHANGE UP (ref 80–100)
MONOCYTES # BLD AUTO: 0.51 K/UL — SIGNIFICANT CHANGE UP (ref 0–0.9)
MONOCYTES NFR BLD AUTO: 6.3 % — SIGNIFICANT CHANGE UP (ref 2–14)
NEUTROPHILS # BLD AUTO: 3.49 K/UL — SIGNIFICANT CHANGE UP (ref 1.8–7.4)
NEUTROPHILS NFR BLD AUTO: 43 % — SIGNIFICANT CHANGE UP (ref 43–77)
NITRITE UR-MCNC: NEGATIVE — SIGNIFICANT CHANGE UP
NRBC # BLD: 0 /100 WBCS — SIGNIFICANT CHANGE UP (ref 0–0)
PH UR: 6.5 — SIGNIFICANT CHANGE UP (ref 5–8)
PLATELET # BLD AUTO: 309 K/UL — SIGNIFICANT CHANGE UP (ref 150–400)
POTASSIUM SERPL-MCNC: 3.7 MMOL/L — SIGNIFICANT CHANGE UP (ref 3.5–5.3)
POTASSIUM SERPL-SCNC: 3.7 MMOL/L — SIGNIFICANT CHANGE UP (ref 3.5–5.3)
PROT SERPL-MCNC: 7.9 G/DL — SIGNIFICANT CHANGE UP (ref 6–8.3)
PROT UR-MCNC: NEGATIVE MG/DL — SIGNIFICANT CHANGE UP
RBC # BLD: 4.1 M/UL — SIGNIFICANT CHANGE UP (ref 3.8–5.2)
RBC # FLD: 12.8 % — SIGNIFICANT CHANGE UP (ref 10.3–14.5)
RBC CASTS # UR COMP ASSIST: 4 /HPF — SIGNIFICANT CHANGE UP (ref 0–4)
SODIUM SERPL-SCNC: 143 MMOL/L — SIGNIFICANT CHANGE UP (ref 135–145)
SP GR SPEC: 1.02 — SIGNIFICANT CHANGE UP (ref 1–1.03)
SQUAMOUS # UR AUTO: 6 /HPF — HIGH (ref 0–5)
UROBILINOGEN FLD QL: 0.2 MG/DL — SIGNIFICANT CHANGE UP (ref 0.2–1)
WBC # BLD: 8.12 K/UL — SIGNIFICANT CHANGE UP (ref 3.8–10.5)
WBC # FLD AUTO: 8.12 K/UL — SIGNIFICANT CHANGE UP (ref 3.8–10.5)
WBC UR QL: 2 /HPF — SIGNIFICANT CHANGE UP (ref 0–5)

## 2024-04-23 PROCEDURE — 81001 URINALYSIS AUTO W/SCOPE: CPT

## 2024-04-23 PROCEDURE — 83690 ASSAY OF LIPASE: CPT

## 2024-04-23 PROCEDURE — 76856 US EXAM PELVIC COMPLETE: CPT

## 2024-04-23 PROCEDURE — 76830 TRANSVAGINAL US NON-OB: CPT

## 2024-04-23 PROCEDURE — 76830 TRANSVAGINAL US NON-OB: CPT | Mod: 26

## 2024-04-23 PROCEDURE — 99284 EMERGENCY DEPT VISIT MOD MDM: CPT

## 2024-04-23 PROCEDURE — 85025 COMPLETE CBC W/AUTO DIFF WBC: CPT

## 2024-04-23 PROCEDURE — 76856 US EXAM PELVIC COMPLETE: CPT | Mod: 26

## 2024-04-23 PROCEDURE — 36415 COLL VENOUS BLD VENIPUNCTURE: CPT

## 2024-04-23 PROCEDURE — 99284 EMERGENCY DEPT VISIT MOD MDM: CPT | Mod: 25

## 2024-04-23 PROCEDURE — 87591 N.GONORRHOEAE DNA AMP PROB: CPT

## 2024-04-23 PROCEDURE — 87491 CHLMYD TRACH DNA AMP PROBE: CPT

## 2024-04-23 PROCEDURE — 84702 CHORIONIC GONADOTROPIN TEST: CPT

## 2024-04-23 PROCEDURE — 80053 COMPREHEN METABOLIC PANEL: CPT

## 2024-04-23 RX ORDER — ACETAMINOPHEN 500 MG
650 TABLET ORAL ONCE
Refills: 0 | Status: COMPLETED | OUTPATIENT
Start: 2024-04-23 | End: 2024-04-23

## 2024-04-23 RX ADMIN — Medication 650 MILLIGRAM(S): at 18:16

## 2024-04-23 RX ADMIN — Medication 650 MILLIGRAM(S): at 20:00

## 2024-04-23 NOTE — ED PROVIDER NOTE - NS ED ROS FT
Positive: Vaginal pain, dysuria, rectal pain   negative: Fever, chills, congestion, cough, chest pain, shortness of breath, nausea, vomiting, diarrhea, abdominal pain, hematuria,  vaginal bleeding, vaginal discharge, leg edema, or

## 2024-04-23 NOTE — ED ADULT NURSE REASSESSMENT NOTE - NS ED NURSE REASSESS COMMENT FT1
Patient aoX 3, c/o of pelvic and rectal pain w/ dysuria, no hematuria, no vaginal bleed/discharge.  Vital signs stble.  PIV #20 in place, all labs sent, no complications.  Pain improved s/p Tylenol 650mg PO.  US resulted.  Results and dispostion pending.

## 2024-04-23 NOTE — ED ADULT NURSE NOTE - NSFALLUNIVINTERV_ED_ALL_ED
Bed/Stretcher in lowest position, wheels locked, appropriate side rails in place/Call bell, personal items and telephone in reach/Instruct patient to call for assistance before getting out of bed/chair/stretcher/Non-slip footwear applied when patient is off stretcher/[place] to call system/Physically safe environment - no spills, clutter or unnecessary equipment/Purposeful proactive rounding/Room/bathroom lighting operational, light cord in reach

## 2024-04-23 NOTE — ED ADULT NURSE NOTE - OBJECTIVE STATEMENT
Patient presents to ER Aox3 speaking in full sentences c/o abd pain and dizziness x3days. denies n/v/d, sob, difficulty breathing, weakness, chest pain.

## 2024-04-23 NOTE — ED PROVIDER NOTE - OBJECTIVE STATEMENT
28-year-old female with  no past medical history, past surgical history of  presents with 1 week history of pelvic pain, dysuria, and rectal pain.  No history of similar symptoms.  Denies vaginal discharge or vaginal bleeding. last BM yesterday.

## 2024-04-23 NOTE — ED PROVIDER NOTE - NSFOLLOWUPINSTRUCTIONS_ED_ALL_ED_FT
Please reach out to Mahogany Lopez ([facility] ED clinical referral coordinator) to assist you with your follow-up appointment with an OBGYN.    Monday - Friday 11am-7pm  [phone]  [email or web address]     1. You were seen for pelvic pain. A copy of any of your resulted labs, imaging, and findings have been provided to you. Make sure to view any test results that may not have yet resulted at the time of your discharge by creating a FollowMyHealth account at: [email or web address] to sign up for FollowMyHealth. Please review all of your lab, imaging, and all other results in their entirety with your primary care doctor.   2. Continue to take your home medications as prescribed. Take over the counter motrin 600 mg with food every six hours (do not exceed 3,200 mg in a 24 hour period) and supplement with tylenol 650 mg every six hours (do not exceed 4000 mg of tylenol in a 24 hour period and do not drink alcohol while taking tylenol) between the motrin dosages to have a layered effect. Consult a pharmacist or your primary care doctor with any questions.   3. Follow up with an OBGYN and your primary care doctor within 48 hours to assess the symptoms you were seen for in the emergency department and to review all results from your visit. If you don't have a doctor, call [phone] to make an appointment.  4. Return immediately to the emergency department for new, persistent, or worsening symptoms or signs. Return immediately to the emergency department if you have chest pain, shortness of breath, loss of consciousness, fever, vomiting, or worsening pain.   5. For your for health, you should make healthy food choices and be physically active. Also, you should not smoke or use drugs, and you should not drink alcohol in excess. Please visit [email or web address] for resources and more information. Statement Selected

## 2024-04-23 NOTE — ED PROVIDER NOTE - PATIENT PORTAL LINK FT
You can access the FollowMyHealth Patient Portal offered by [facility] by registering at the following website: [email or web address]. By joining [employer]’s FollowMyHealth portal, you will also be able to view your health information using other applications (apps) compatible with our system.

## 2024-04-23 NOTE — ED PROVIDER NOTE - CLINICAL SUMMARY MEDICAL DECISION MAKING FREE TEXT BOX
28-year-old female with  no past medical history, past surgical history of  presents with 1 week history of pelvic pain, dysuria, and rectal pain. On exam, VS are wnl, scant white vaginal discharge, pt does not want NORBERT or bimanual exam.    ddx: uti vs pregnancy vs uterine fibroid    Plan:  - cbc and bmp are grossly wnl, ua shows no e/o uti (only trace LE)  - tvus pending  - tylenol

## 2024-04-23 NOTE — ED PROVIDER NOTE - CARE PLAN
1 Principal Discharge DX:	Dysuria   Principal Discharge DX:	Dysuria  Secondary Diagnosis:	Dermoid cyst

## 2024-04-23 NOTE — ED PROVIDER NOTE - PROGRESS NOTE DETAILS
Sonenthal, MD: tvus: IMPRESSION:  No acute pathology.    Echogenic structure in the left ovary possibly representing a dermoid   cyst. No evidence of left ovarian torsion.    Right ovary not visualized.    pt with non-tender abdomen on exam as above, will dc with obgyn f/u for cystic structure on L ovary

## 2024-04-24 LAB
C TRACH RRNA SPEC QL NAA+PROBE: SIGNIFICANT CHANGE UP
N GONORRHOEA RRNA SPEC QL NAA+PROBE: SIGNIFICANT CHANGE UP

## 2024-04-26 DIAGNOSIS — R10.2 PELVIC AND PERINEAL PAIN: ICD-10-CM

## 2024-04-26 DIAGNOSIS — Z91.040 LATEX ALLERGY STATUS: ICD-10-CM

## 2024-04-26 DIAGNOSIS — R30.0 DYSURIA: ICD-10-CM

## 2024-04-26 DIAGNOSIS — K62.89 OTHER SPECIFIED DISEASES OF ANUS AND RECTUM: ICD-10-CM

## 2024-04-26 DIAGNOSIS — Z98.891 HISTORY OF UTERINE SCAR FROM PREVIOUS SURGERY: ICD-10-CM

## 2024-04-26 DIAGNOSIS — D36.9 BENIGN NEOPLASM, UNSPECIFIED SITE: ICD-10-CM

## 2024-06-04 NOTE — ED ADULT NURSE NOTE - CAS TRG GENERAL NORM CIRC DET
well developed, well nourished , in no acute distress , ambulating without difficulty , normal communication ability
Strong peripheral pulses

## 2024-07-17 ENCOUNTER — EMERGENCY (EMERGENCY)
Facility: HOSPITAL | Age: 29
LOS: 1 days | Discharge: HOME | End: 2024-07-17
Attending: STUDENT IN AN ORGANIZED HEALTH CARE EDUCATION/TRAINING PROGRAM | Admitting: STUDENT IN AN ORGANIZED HEALTH CARE EDUCATION/TRAINING PROGRAM
Payer: COMMERCIAL

## 2024-07-17 VITALS
SYSTOLIC BLOOD PRESSURE: 126 MMHG | DIASTOLIC BLOOD PRESSURE: 80 MMHG | WEIGHT: 195.99 LBS | OXYGEN SATURATION: 98 % | TEMPERATURE: 99 F | HEIGHT: 67 IN | RESPIRATION RATE: 17 BRPM | HEART RATE: 80 BPM

## 2024-07-17 DIAGNOSIS — Z98.891 HISTORY OF UTERINE SCAR FROM PREVIOUS SURGERY: Chronic | ICD-10-CM

## 2024-07-17 PROCEDURE — 99284 EMERGENCY DEPT VISIT MOD MDM: CPT | Mod: 25

## 2024-07-18 PROCEDURE — 96372 THER/PROPH/DIAG INJ SC/IM: CPT

## 2024-07-18 PROCEDURE — 99283 EMERGENCY DEPT VISIT LOW MDM: CPT | Mod: 25

## 2024-07-18 RX ORDER — AMOXICILLIN 500 MG/1
875 CAPSULE ORAL
Refills: 0 | Status: DISCONTINUED | OUTPATIENT
Start: 2024-07-18 | End: 2024-07-21

## 2024-07-18 RX ORDER — AMOXICILLIN 500 MG/1
1 CAPSULE ORAL
Qty: 15 | Refills: 0
Start: 2024-07-18 | End: 2024-07-22

## 2024-07-18 RX ORDER — KETOROLAC TROMETHAMINE 30 MG/ML
30 INJECTION, SOLUTION INTRAMUSCULAR; INTRAVENOUS ONCE
Refills: 0 | Status: DISCONTINUED | OUTPATIENT
Start: 2024-07-18 | End: 2024-07-18

## 2024-07-18 RX ADMIN — KETOROLAC TROMETHAMINE 30 MILLIGRAM(S): 30 INJECTION, SOLUTION INTRAMUSCULAR; INTRAVENOUS at 01:35

## 2024-07-18 RX ADMIN — AMOXICILLIN 875 MILLIGRAM(S): 500 CAPSULE ORAL at 01:35

## 2024-07-19 DIAGNOSIS — H92.02 OTALGIA, LEFT EAR: ICD-10-CM

## 2024-07-19 DIAGNOSIS — H66.92 OTITIS MEDIA, UNSPECIFIED, LEFT EAR: ICD-10-CM

## 2024-07-19 DIAGNOSIS — Z91.040 LATEX ALLERGY STATUS: ICD-10-CM

## 2024-08-22 NOTE — ED ADULT TRIAGE NOTE - NS ED TRIAGE HISTORIAN
Quality 226: Preventive Care And Screening: Tobacco Use: Screening And Cessation Intervention: Patient screened for tobacco use and is an ex/non-smoker Detail Level: Detailed Patient

## 2025-04-10 NOTE — ED PROVIDER NOTE - CHIEF COMPLAINT
Hello - I am reaching out from the [facility] Navigation department, following up on an order from your provider's office to assist in connecting you with resources for care. If you would like to discuss this further, please give us a call at [phone], or for more immediate assistance you can contact our 24-hour help line at [phone]. We look forward to hearing from you soon.   The patient is a 27y Female complaining of abdominal pain.